# Patient Record
Sex: FEMALE | Race: BLACK OR AFRICAN AMERICAN | NOT HISPANIC OR LATINO | Employment: OTHER | RURAL
[De-identification: names, ages, dates, MRNs, and addresses within clinical notes are randomized per-mention and may not be internally consistent; named-entity substitution may affect disease eponyms.]

---

## 2017-07-03 ENCOUNTER — HISTORICAL (OUTPATIENT)
Dept: ADMINISTRATIVE | Facility: HOSPITAL | Age: 62
End: 2017-07-03

## 2017-07-06 LAB
LAB AP CLINICAL INFORMATION: NORMAL
LAB AP DIAGNOSIS - HISTORICAL: NORMAL
LAB AP GROSS PATHOLOGY - HISTORICAL: NORMAL
LAB AP SPECIMEN SUBMITTED - HISTORICAL: NORMAL

## 2019-12-11 LAB — CRC RECOMMENDATION EXT: NORMAL

## 2020-09-30 ENCOUNTER — HISTORICAL (OUTPATIENT)
Dept: ADMINISTRATIVE | Facility: HOSPITAL | Age: 65
End: 2020-09-30

## 2020-10-08 ENCOUNTER — HISTORICAL (OUTPATIENT)
Dept: ADMINISTRATIVE | Facility: HOSPITAL | Age: 65
End: 2020-10-08

## 2020-10-08 LAB — SARS-COV+SARS-COV-2 AG RESP QL IA.RAPID: NEGATIVE

## 2020-10-12 ENCOUNTER — HISTORICAL (OUTPATIENT)
Dept: ADMINISTRATIVE | Facility: HOSPITAL | Age: 65
End: 2020-10-12

## 2020-10-12 LAB
HCT VFR BLD AUTO: 39.1 % (ref 38–47)
HGB BLD-MCNC: 13.1 G/DL (ref 12–16)
MCHC RBC AUTO-ENTMCNC: 33.5 G/DL (ref 32–36)
POTASSIUM SERPL-SCNC: 3.3 MMOL/L (ref 3.5–5.1)

## 2020-11-30 ENCOUNTER — HISTORICAL (OUTPATIENT)
Dept: ADMINISTRATIVE | Facility: HOSPITAL | Age: 65
End: 2020-11-30

## 2020-11-30 LAB — SARS-COV+SARS-COV-2 AG RESP QL IA.RAPID: NEGATIVE

## 2021-07-21 ENCOUNTER — OFFICE VISIT (OUTPATIENT)
Dept: FAMILY MEDICINE | Facility: CLINIC | Age: 66
End: 2021-07-21
Payer: COMMERCIAL

## 2021-07-21 VITALS
WEIGHT: 208 LBS | HEART RATE: 88 BPM | RESPIRATION RATE: 16 BRPM | DIASTOLIC BLOOD PRESSURE: 70 MMHG | SYSTOLIC BLOOD PRESSURE: 120 MMHG | BODY MASS INDEX: 35.51 KG/M2 | HEIGHT: 64 IN | TEMPERATURE: 98 F

## 2021-07-21 DIAGNOSIS — I25.10 CORONARY ARTERY DISEASE, ANGINA PRESENCE UNSPECIFIED, UNSPECIFIED VESSEL OR LESION TYPE, UNSPECIFIED WHETHER NATIVE OR TRANSPLANTED HEART: ICD-10-CM

## 2021-07-21 DIAGNOSIS — E11.9 DIABETES MELLITUS WITHOUT COMPLICATION: ICD-10-CM

## 2021-07-21 DIAGNOSIS — Z79.899 ENCOUNTER FOR LONG-TERM (CURRENT) USE OF OTHER MEDICATIONS: ICD-10-CM

## 2021-07-21 DIAGNOSIS — F51.01 PRIMARY INSOMNIA: Primary | ICD-10-CM

## 2021-07-21 DIAGNOSIS — I10 ESSENTIAL HYPERTENSION, BENIGN: ICD-10-CM

## 2021-07-21 DIAGNOSIS — E78.5 HYPERLIPIDEMIA, UNSPECIFIED HYPERLIPIDEMIA TYPE: ICD-10-CM

## 2021-07-21 LAB
ALBUMIN SERPL BCP-MCNC: 4.1 G/DL (ref 3.5–5)
ALBUMIN/GLOB SERPL: 1.1 {RATIO}
ALP SERPL-CCNC: 70 U/L (ref 50–130)
ALT SERPL W P-5'-P-CCNC: 44 U/L (ref 13–56)
ANION GAP SERPL CALCULATED.3IONS-SCNC: 12 MMOL/L (ref 7–16)
AST SERPL W P-5'-P-CCNC: 23 U/L (ref 15–37)
BASOPHILS # BLD AUTO: 0.03 K/UL (ref 0–0.2)
BASOPHILS NFR BLD AUTO: 0.8 % (ref 0–1)
BILIRUB SERPL-MCNC: 0.5 MG/DL (ref 0–1.2)
BUN SERPL-MCNC: 13 MG/DL (ref 7–18)
BUN/CREAT SERPL: 12 (ref 6–20)
CALCIUM SERPL-MCNC: 9.7 MG/DL (ref 8.5–10.1)
CHLORIDE SERPL-SCNC: 107 MMOL/L (ref 98–107)
CHOLEST SERPL-MCNC: 191 MG/DL (ref 0–200)
CHOLEST/HDLC SERPL: 3.3 {RATIO}
CO2 SERPL-SCNC: 27 MMOL/L (ref 21–32)
CREAT SERPL-MCNC: 1.06 MG/DL (ref 0.55–1.02)
DIFFERENTIAL METHOD BLD: ABNORMAL
EOSINOPHIL # BLD AUTO: 0.09 K/UL (ref 0–0.5)
EOSINOPHIL NFR BLD AUTO: 2.4 % (ref 1–4)
ERYTHROCYTE [DISTWIDTH] IN BLOOD BY AUTOMATED COUNT: 14.4 % (ref 11.5–14.5)
EST. AVERAGE GLUCOSE BLD GHB EST-MCNC: 114 MG/DL
GLOBULIN SER-MCNC: 3.6 G/DL (ref 2–4)
GLUCOSE SERPL-MCNC: 90 MG/DL (ref 74–106)
HBA1C MFR BLD HPLC: 6 % (ref 4.5–6.6)
HCT VFR BLD AUTO: 40.7 % (ref 38–47)
HDLC SERPL-MCNC: 58 MG/DL (ref 40–60)
HGB BLD-MCNC: 13.3 G/DL (ref 12–16)
IMM GRANULOCYTES # BLD AUTO: 0.01 K/UL (ref 0–0.04)
IMM GRANULOCYTES NFR BLD: 0.3 % (ref 0–0.4)
LDLC SERPL CALC-MCNC: 96 MG/DL
LDLC/HDLC SERPL: 1.7 {RATIO}
LYMPHOCYTES # BLD AUTO: 1.63 K/UL (ref 1–4.8)
LYMPHOCYTES NFR BLD AUTO: 44.1 % (ref 27–41)
MCH RBC QN AUTO: 26.4 PG (ref 27–31)
MCHC RBC AUTO-ENTMCNC: 32.7 G/DL (ref 32–36)
MCV RBC AUTO: 80.8 FL (ref 80–96)
MONOCYTES # BLD AUTO: 0.39 K/UL (ref 0–0.8)
MONOCYTES NFR BLD AUTO: 10.5 % (ref 2–6)
MPC BLD CALC-MCNC: 9.8 FL (ref 9.4–12.4)
NEUTROPHILS # BLD AUTO: 1.55 K/UL (ref 1.8–7.7)
NEUTROPHILS NFR BLD AUTO: 41.9 % (ref 53–65)
NONHDLC SERPL-MCNC: 133 MG/DL
NRBC # BLD AUTO: 0 X10E3/UL
NRBC, AUTO (.00): 0 %
PLATELET # BLD AUTO: 339 K/UL (ref 150–400)
POTASSIUM SERPL-SCNC: 3.5 MMOL/L (ref 3.5–5.1)
PROT SERPL-MCNC: 7.7 G/DL (ref 6.4–8.2)
RBC # BLD AUTO: 5.04 M/UL (ref 4.2–5.4)
SODIUM SERPL-SCNC: 142 MMOL/L (ref 136–145)
TRIGL SERPL-MCNC: 187 MG/DL (ref 35–150)
VLDLC SERPL-MCNC: 37 MG/DL
WBC # BLD AUTO: 3.7 K/UL (ref 4.5–11)

## 2021-07-21 PROCEDURE — 83036 HEMOGLOBIN GLYCOSYLATED A1C: CPT | Mod: ,,, | Performed by: CLINICAL MEDICAL LABORATORY

## 2021-07-21 PROCEDURE — 99214 OFFICE O/P EST MOD 30 MIN: CPT | Mod: ,,, | Performed by: FAMILY MEDICINE

## 2021-07-21 PROCEDURE — 99214 PR OFFICE/OUTPT VISIT, EST, LEVL IV, 30-39 MIN: ICD-10-PCS | Mod: ,,, | Performed by: FAMILY MEDICINE

## 2021-07-21 PROCEDURE — 80053 COMPREHENSIVE METABOLIC PANEL: ICD-10-PCS | Mod: ,,, | Performed by: CLINICAL MEDICAL LABORATORY

## 2021-07-21 PROCEDURE — 85025 CBC WITH DIFFERENTIAL: ICD-10-PCS | Mod: ,,, | Performed by: CLINICAL MEDICAL LABORATORY

## 2021-07-21 PROCEDURE — 85025 COMPLETE CBC W/AUTO DIFF WBC: CPT | Mod: ,,, | Performed by: CLINICAL MEDICAL LABORATORY

## 2021-07-21 PROCEDURE — 80061 LIPID PANEL: ICD-10-PCS | Mod: ,,, | Performed by: CLINICAL MEDICAL LABORATORY

## 2021-07-21 PROCEDURE — 83036 HEMOGLOBIN A1C: ICD-10-PCS | Mod: ,,, | Performed by: CLINICAL MEDICAL LABORATORY

## 2021-07-21 PROCEDURE — 80061 LIPID PANEL: CPT | Mod: ,,, | Performed by: CLINICAL MEDICAL LABORATORY

## 2021-07-21 PROCEDURE — 80053 COMPREHEN METABOLIC PANEL: CPT | Mod: ,,, | Performed by: CLINICAL MEDICAL LABORATORY

## 2021-07-21 RX ORDER — METOPROLOL SUCCINATE 25 MG/1
1 TABLET, EXTENDED RELEASE ORAL DAILY
COMMUNITY
Start: 2021-05-18

## 2021-07-21 RX ORDER — LANOLIN ALCOHOL/MO/W.PET/CERES
2 CREAM (GRAM) TOPICAL DAILY
COMMUNITY

## 2021-07-21 RX ORDER — CHLORTHALIDONE 25 MG/1
TABLET ORAL
COMMUNITY
Start: 2021-05-18

## 2021-07-21 RX ORDER — ROSUVASTATIN CALCIUM 5 MG/1
1 TABLET, COATED ORAL DAILY
COMMUNITY
Start: 2021-05-18 | End: 2021-07-22

## 2021-07-21 RX ORDER — TRAZODONE HYDROCHLORIDE 50 MG/1
50 TABLET ORAL NIGHTLY
Qty: 30 TABLET | Refills: 1 | Status: SHIPPED | OUTPATIENT
Start: 2021-07-21 | End: 2021-11-03

## 2021-07-21 RX ORDER — POTASSIUM CHLORIDE 20 MEQ/1
1 TABLET, EXTENDED RELEASE ORAL 2 TIMES DAILY
COMMUNITY
Start: 2021-05-18

## 2021-07-21 RX ORDER — ASPIRIN 81 MG/1
81 TABLET ORAL DAILY
COMMUNITY

## 2021-07-21 RX ORDER — ACETAMINOPHEN 500 MG
1 TABLET ORAL DAILY
COMMUNITY

## 2021-07-22 ENCOUNTER — TELEPHONE (OUTPATIENT)
Dept: FAMILY MEDICINE | Facility: CLINIC | Age: 66
End: 2021-07-22

## 2021-07-22 RX ORDER — ROSUVASTATIN CALCIUM 10 MG/1
10 TABLET, COATED ORAL DAILY
Qty: 90 TABLET | Refills: 3 | Status: SHIPPED | OUTPATIENT
Start: 2021-07-22 | End: 2022-07-22

## 2021-07-22 RX ORDER — LEMBOREXANT 5 MG/1
5 TABLET, FILM COATED ORAL NIGHTLY
Qty: 30 TABLET | Refills: 0 | Status: SHIPPED | OUTPATIENT
Start: 2021-07-22 | End: 2021-09-20

## 2021-07-23 RX ORDER — HYDROXYZINE HYDROCHLORIDE 25 MG/1
TABLET, FILM COATED ORAL
Qty: 30 TABLET | Refills: 0 | Status: SHIPPED | OUTPATIENT
Start: 2021-07-23 | End: 2021-11-03

## 2021-08-08 ENCOUNTER — OFFICE VISIT (OUTPATIENT)
Dept: FAMILY MEDICINE | Facility: CLINIC | Age: 66
End: 2021-08-08
Payer: COMMERCIAL

## 2021-08-08 VITALS — OXYGEN SATURATION: 97 % | TEMPERATURE: 98 F | HEART RATE: 93 BPM

## 2021-08-08 DIAGNOSIS — Z20.822 CONTACT WITH AND (SUSPECTED) EXPOSURE TO COVID-19: Primary | ICD-10-CM

## 2021-08-08 LAB
CTP QC/QA: YES
SARS-COV-2 AG RESP QL IA.RAPID: NEGATIVE

## 2021-08-08 PROCEDURE — 1159F PR MEDICATION LIST DOCUMENTED IN MEDICAL RECORD: ICD-10-PCS | Mod: ,,, | Performed by: NURSE PRACTITIONER

## 2021-08-08 PROCEDURE — 1160F RVW MEDS BY RX/DR IN RCRD: CPT | Mod: ,,, | Performed by: NURSE PRACTITIONER

## 2021-08-08 PROCEDURE — 3044F HG A1C LEVEL LT 7.0%: CPT | Mod: ,,, | Performed by: NURSE PRACTITIONER

## 2021-08-08 PROCEDURE — 99051 MED SERV EVE/WKEND/HOLIDAY: CPT | Mod: ,,, | Performed by: NURSE PRACTITIONER

## 2021-08-08 PROCEDURE — 99051 PR MEDICAL SERVICES, EVE/WKEND/HOLIDAY: ICD-10-PCS | Mod: ,,, | Performed by: NURSE PRACTITIONER

## 2021-08-08 PROCEDURE — 99213 PR OFFICE/OUTPT VISIT, EST, LEVL III, 20-29 MIN: ICD-10-PCS | Mod: ,,, | Performed by: NURSE PRACTITIONER

## 2021-08-08 PROCEDURE — 87426 SARS CORONAVIRUS 2 ANTIGEN POCT: ICD-10-PCS | Mod: QW,,, | Performed by: NURSE PRACTITIONER

## 2021-08-08 PROCEDURE — 87426 SARSCOV CORONAVIRUS AG IA: CPT | Mod: QW,,, | Performed by: NURSE PRACTITIONER

## 2021-08-08 PROCEDURE — 1160F PR REVIEW ALL MEDS BY PRESCRIBER/CLIN PHARMACIST DOCUMENTED: ICD-10-PCS | Mod: ,,, | Performed by: NURSE PRACTITIONER

## 2021-08-08 PROCEDURE — 99213 OFFICE O/P EST LOW 20 MIN: CPT | Mod: ,,, | Performed by: NURSE PRACTITIONER

## 2021-08-08 PROCEDURE — 3044F PR MOST RECENT HEMOGLOBIN A1C LEVEL <7.0%: ICD-10-PCS | Mod: ,,, | Performed by: NURSE PRACTITIONER

## 2021-08-08 PROCEDURE — 1159F MED LIST DOCD IN RCRD: CPT | Mod: ,,, | Performed by: NURSE PRACTITIONER

## 2021-09-16 ENCOUNTER — OFFICE VISIT (OUTPATIENT)
Dept: FAMILY MEDICINE | Facility: CLINIC | Age: 66
End: 2021-09-16
Payer: COMMERCIAL

## 2021-09-16 VITALS
BODY MASS INDEX: 36.02 KG/M2 | DIASTOLIC BLOOD PRESSURE: 70 MMHG | WEIGHT: 211 LBS | RESPIRATION RATE: 16 BRPM | HEART RATE: 96 BPM | OXYGEN SATURATION: 99 % | HEIGHT: 64 IN | SYSTOLIC BLOOD PRESSURE: 120 MMHG | TEMPERATURE: 98 F

## 2021-09-16 DIAGNOSIS — Z79.899 ENCOUNTER FOR LONG-TERM (CURRENT) USE OF OTHER MEDICATIONS: ICD-10-CM

## 2021-09-16 DIAGNOSIS — L65.9 ALOPECIA: ICD-10-CM

## 2021-09-16 DIAGNOSIS — E78.5 HYPERLIPIDEMIA, UNSPECIFIED HYPERLIPIDEMIA TYPE: ICD-10-CM

## 2021-09-16 DIAGNOSIS — I10 ESSENTIAL HYPERTENSION, BENIGN: ICD-10-CM

## 2021-09-16 DIAGNOSIS — I25.10 CORONARY ARTERY DISEASE, ANGINA PRESENCE UNSPECIFIED, UNSPECIFIED VESSEL OR LESION TYPE, UNSPECIFIED WHETHER NATIVE OR TRANSPLANTED HEART: ICD-10-CM

## 2021-09-16 DIAGNOSIS — E11.9 DIABETES MELLITUS WITHOUT COMPLICATION: ICD-10-CM

## 2021-09-16 DIAGNOSIS — D70.9 NEUTROPENIA, UNSPECIFIED TYPE: ICD-10-CM

## 2021-09-16 DIAGNOSIS — R22.42 SUBCUTANEOUS MASS OF LEFT LOWER EXTREMITY: Primary | ICD-10-CM

## 2021-09-16 DIAGNOSIS — E55.9 VITAMIN D DEFICIENCY: ICD-10-CM

## 2021-09-16 LAB
BASOPHILS # BLD AUTO: 0.04 K/UL (ref 0–0.2)
BASOPHILS NFR BLD AUTO: 0.9 % (ref 0–1)
DIFFERENTIAL METHOD BLD: ABNORMAL
EOSINOPHIL # BLD AUTO: 0.09 K/UL (ref 0–0.5)
EOSINOPHIL NFR BLD AUTO: 2.1 % (ref 1–4)
ERYTHROCYTE [DISTWIDTH] IN BLOOD BY AUTOMATED COUNT: 14.1 % (ref 11.5–14.5)
ERYTHROCYTE [SEDIMENTATION RATE] IN BLOOD BY WESTERGREN METHOD: 7 MM/HR (ref 0–30)
HCT VFR BLD AUTO: 40.8 % (ref 38–47)
HGB BLD-MCNC: 13.4 G/DL (ref 12–16)
IMM GRANULOCYTES # BLD AUTO: 0.01 K/UL (ref 0–0.04)
IMM GRANULOCYTES NFR BLD: 0.2 % (ref 0–0.4)
LYMPHOCYTES # BLD AUTO: 1.97 K/UL (ref 1–4.8)
LYMPHOCYTES NFR BLD AUTO: 45.8 % (ref 27–41)
MCH RBC QN AUTO: 26.5 PG (ref 27–31)
MCHC RBC AUTO-ENTMCNC: 32.8 G/DL (ref 32–36)
MCV RBC AUTO: 80.6 FL (ref 80–96)
MONOCYTES # BLD AUTO: 0.48 K/UL (ref 0–0.8)
MONOCYTES NFR BLD AUTO: 11.2 % (ref 2–6)
MPC BLD CALC-MCNC: 9.9 FL (ref 9.4–12.4)
NEUTROPHILS # BLD AUTO: 1.71 K/UL (ref 1.8–7.7)
NEUTROPHILS NFR BLD AUTO: 39.8 % (ref 53–65)
NRBC # BLD AUTO: 0 X10E3/UL
NRBC, AUTO (.00): 0 %
PLATELET # BLD AUTO: 357 K/UL (ref 150–400)
RBC # BLD AUTO: 5.06 M/UL (ref 4.2–5.4)
WBC # BLD AUTO: 4.3 K/UL (ref 4.5–11)

## 2021-09-16 PROCEDURE — 82746 VITAMIN B12/FOLATE, SERUM PANEL: ICD-10-PCS | Mod: ,,, | Performed by: CLINICAL MEDICAL LABORATORY

## 2021-09-16 PROCEDURE — 83550 IRON BINDING TEST: CPT | Mod: ,,, | Performed by: CLINICAL MEDICAL LABORATORY

## 2021-09-16 PROCEDURE — 1160F RVW MEDS BY RX/DR IN RCRD: CPT | Mod: ,,, | Performed by: FAMILY MEDICINE

## 2021-09-16 PROCEDURE — 1160F PR REVIEW ALL MEDS BY PRESCRIBER/CLIN PHARMACIST DOCUMENTED: ICD-10-PCS | Mod: ,,, | Performed by: FAMILY MEDICINE

## 2021-09-16 PROCEDURE — 84443 TSH: ICD-10-PCS | Mod: ,,, | Performed by: CLINICAL MEDICAL LABORATORY

## 2021-09-16 PROCEDURE — 3074F SYST BP LT 130 MM HG: CPT | Mod: ,,, | Performed by: FAMILY MEDICINE

## 2021-09-16 PROCEDURE — 3074F PR MOST RECENT SYSTOLIC BLOOD PRESSURE < 130 MM HG: ICD-10-PCS | Mod: ,,, | Performed by: FAMILY MEDICINE

## 2021-09-16 PROCEDURE — 1159F MED LIST DOCD IN RCRD: CPT | Mod: ,,, | Performed by: FAMILY MEDICINE

## 2021-09-16 PROCEDURE — 84443 ASSAY THYROID STIM HORMONE: CPT | Mod: ,,, | Performed by: CLINICAL MEDICAL LABORATORY

## 2021-09-16 PROCEDURE — 3078F DIAST BP <80 MM HG: CPT | Mod: ,,, | Performed by: FAMILY MEDICINE

## 2021-09-16 PROCEDURE — 99213 OFFICE O/P EST LOW 20 MIN: CPT | Mod: ,,, | Performed by: FAMILY MEDICINE

## 2021-09-16 PROCEDURE — 83540 IRON AND TIBC: ICD-10-PCS | Mod: ,,, | Performed by: CLINICAL MEDICAL LABORATORY

## 2021-09-16 PROCEDURE — 84439 T4, FREE: ICD-10-PCS | Mod: ,,, | Performed by: CLINICAL MEDICAL LABORATORY

## 2021-09-16 PROCEDURE — 85025 COMPLETE CBC W/AUTO DIFF WBC: CPT | Mod: ,,, | Performed by: CLINICAL MEDICAL LABORATORY

## 2021-09-16 PROCEDURE — 3008F BODY MASS INDEX DOCD: CPT | Mod: ,,, | Performed by: FAMILY MEDICINE

## 2021-09-16 PROCEDURE — 82607 VITAMIN B12/FOLATE, SERUM PANEL: ICD-10-PCS | Mod: ,,, | Performed by: CLINICAL MEDICAL LABORATORY

## 2021-09-16 PROCEDURE — 82306 VITAMIN D: ICD-10-PCS | Mod: ,,, | Performed by: CLINICAL MEDICAL LABORATORY

## 2021-09-16 PROCEDURE — 82607 VITAMIN B-12: CPT | Mod: ,,, | Performed by: CLINICAL MEDICAL LABORATORY

## 2021-09-16 PROCEDURE — 99213 PR OFFICE/OUTPT VISIT, EST, LEVL III, 20-29 MIN: ICD-10-PCS | Mod: ,,, | Performed by: FAMILY MEDICINE

## 2021-09-16 PROCEDURE — 82728 FERRITIN: ICD-10-PCS | Mod: ,,, | Performed by: CLINICAL MEDICAL LABORATORY

## 2021-09-16 PROCEDURE — 83735 ASSAY OF MAGNESIUM: CPT | Mod: ,,, | Performed by: CLINICAL MEDICAL LABORATORY

## 2021-09-16 PROCEDURE — 1159F PR MEDICATION LIST DOCUMENTED IN MEDICAL RECORD: ICD-10-PCS | Mod: ,,, | Performed by: FAMILY MEDICINE

## 2021-09-16 PROCEDURE — 83735 MAGNESIUM: ICD-10-PCS | Mod: ,,, | Performed by: CLINICAL MEDICAL LABORATORY

## 2021-09-16 PROCEDURE — 85025 CBC WITH DIFFERENTIAL: ICD-10-PCS | Mod: ,,, | Performed by: CLINICAL MEDICAL LABORATORY

## 2021-09-16 PROCEDURE — 85651 SEDIMENTATION RATE, AUTOMATED: ICD-10-PCS | Mod: ,,, | Performed by: CLINICAL MEDICAL LABORATORY

## 2021-09-16 PROCEDURE — 83540 ASSAY OF IRON: CPT | Mod: ,,, | Performed by: CLINICAL MEDICAL LABORATORY

## 2021-09-16 PROCEDURE — 85651 RBC SED RATE NONAUTOMATED: CPT | Mod: ,,, | Performed by: CLINICAL MEDICAL LABORATORY

## 2021-09-16 PROCEDURE — 82306 VITAMIN D 25 HYDROXY: CPT | Mod: ,,, | Performed by: CLINICAL MEDICAL LABORATORY

## 2021-09-16 PROCEDURE — 80053 COMPREHEN METABOLIC PANEL: CPT | Mod: ,,, | Performed by: CLINICAL MEDICAL LABORATORY

## 2021-09-16 PROCEDURE — 3044F PR MOST RECENT HEMOGLOBIN A1C LEVEL <7.0%: ICD-10-PCS | Mod: ,,, | Performed by: FAMILY MEDICINE

## 2021-09-16 PROCEDURE — 3008F PR BODY MASS INDEX (BMI) DOCUMENTED: ICD-10-PCS | Mod: ,,, | Performed by: FAMILY MEDICINE

## 2021-09-16 PROCEDURE — 82746 ASSAY OF FOLIC ACID SERUM: CPT | Mod: ,,, | Performed by: CLINICAL MEDICAL LABORATORY

## 2021-09-16 PROCEDURE — 83550 IRON AND TIBC: ICD-10-PCS | Mod: ,,, | Performed by: CLINICAL MEDICAL LABORATORY

## 2021-09-16 PROCEDURE — 3078F PR MOST RECENT DIASTOLIC BLOOD PRESSURE < 80 MM HG: ICD-10-PCS | Mod: ,,, | Performed by: FAMILY MEDICINE

## 2021-09-16 PROCEDURE — 80053 COMPREHENSIVE METABOLIC PANEL: ICD-10-PCS | Mod: ,,, | Performed by: CLINICAL MEDICAL LABORATORY

## 2021-09-16 PROCEDURE — 3044F HG A1C LEVEL LT 7.0%: CPT | Mod: ,,, | Performed by: FAMILY MEDICINE

## 2021-09-16 PROCEDURE — 82728 ASSAY OF FERRITIN: CPT | Mod: ,,, | Performed by: CLINICAL MEDICAL LABORATORY

## 2021-09-16 PROCEDURE — 84439 ASSAY OF FREE THYROXINE: CPT | Mod: ,,, | Performed by: CLINICAL MEDICAL LABORATORY

## 2021-09-17 LAB
25(OH)D3 SERPL-MCNC: 35.4 NG/ML
ALBUMIN SERPL BCP-MCNC: 4.4 G/DL (ref 3.5–5)
ALBUMIN/GLOB SERPL: 1.3 {RATIO}
ALP SERPL-CCNC: 84 U/L (ref 50–130)
ALT SERPL W P-5'-P-CCNC: 42 U/L (ref 13–56)
ANION GAP SERPL CALCULATED.3IONS-SCNC: 12 MMOL/L (ref 7–16)
AST SERPL W P-5'-P-CCNC: 26 U/L (ref 15–37)
BILIRUB SERPL-MCNC: 0.2 MG/DL (ref 0–1.2)
BUN SERPL-MCNC: 16 MG/DL (ref 7–18)
BUN/CREAT SERPL: 13 (ref 6–20)
CALCIUM SERPL-MCNC: 9.6 MG/DL (ref 8.5–10.1)
CHLORIDE SERPL-SCNC: 109 MMOL/L (ref 98–107)
CO2 SERPL-SCNC: 24 MMOL/L (ref 21–32)
CREAT SERPL-MCNC: 1.21 MG/DL (ref 0.55–1.02)
FERRITIN SERPL-MCNC: 58 NG/ML (ref 8–252)
FOLATE SERPL-MCNC: 8.4 NG/ML (ref 3.1–17.5)
GLOBULIN SER-MCNC: 3.4 G/DL (ref 2–4)
GLUCOSE SERPL-MCNC: 107 MG/DL (ref 74–106)
IRON SATN MFR SERPL: 21 % (ref 14–50)
IRON SERPL-MCNC: 71 ΜG/DL (ref 50–170)
MAGNESIUM SERPL-MCNC: 2.2 MG/DL (ref 1.7–2.3)
POTASSIUM SERPL-SCNC: 3.3 MMOL/L (ref 3.5–5.1)
PROT SERPL-MCNC: 7.8 G/DL (ref 6.4–8.2)
SODIUM SERPL-SCNC: 142 MMOL/L (ref 136–145)
T4 FREE SERPL-MCNC: 0.76 NG/DL (ref 0.76–1.46)
TIBC SERPL-MCNC: 338 ΜG/DL (ref 250–450)
TSH SERPL DL<=0.005 MIU/L-ACNC: 1.16 UIU/ML (ref 0.36–3.74)
VIT B12 SERPL-MCNC: 526 PG/ML (ref 193–986)

## 2021-09-22 ENCOUNTER — HOSPITAL ENCOUNTER (OUTPATIENT)
Dept: RADIOLOGY | Facility: HOSPITAL | Age: 66
Discharge: HOME | End: 2021-09-22
Attending: FAMILY MEDICINE
Payer: COMMERCIAL

## 2021-09-22 DIAGNOSIS — R22.42 SUBCUTANEOUS MASS OF LEFT LOWER EXTREMITY: ICD-10-CM

## 2021-09-22 PROCEDURE — 76882 US LMTD JT/FCL EVL NVASC XTR: CPT | Mod: 26,LT,, | Performed by: RADIOLOGY

## 2021-09-22 PROCEDURE — 76882 US EXTREMITY NON VASCULAR LIMITED LEFT: ICD-10-PCS | Mod: 26,LT,, | Performed by: RADIOLOGY

## 2021-09-22 PROCEDURE — 76882 US LMTD JT/FCL EVL NVASC XTR: CPT | Mod: TC,LT

## 2021-10-22 ENCOUNTER — OFFICE VISIT (OUTPATIENT)
Dept: FAMILY MEDICINE | Facility: CLINIC | Age: 66
End: 2021-10-22
Payer: COMMERCIAL

## 2021-10-22 VITALS
OXYGEN SATURATION: 96 % | SYSTOLIC BLOOD PRESSURE: 140 MMHG | BODY MASS INDEX: 36.23 KG/M2 | HEIGHT: 64 IN | HEART RATE: 94 BPM | DIASTOLIC BLOOD PRESSURE: 100 MMHG | WEIGHT: 212.19 LBS | TEMPERATURE: 97 F | RESPIRATION RATE: 18 BRPM

## 2021-10-22 DIAGNOSIS — R53.83 FATIGUE, UNSPECIFIED TYPE: ICD-10-CM

## 2021-10-22 DIAGNOSIS — H11.31 SUBCONJUNCTIVAL HEMORRHAGE OF RIGHT EYE: Primary | ICD-10-CM

## 2021-10-22 PROCEDURE — 3044F PR MOST RECENT HEMOGLOBIN A1C LEVEL <7.0%: ICD-10-PCS | Mod: ,,, | Performed by: FAMILY MEDICINE

## 2021-10-22 PROCEDURE — 99213 OFFICE O/P EST LOW 20 MIN: CPT | Mod: ,,, | Performed by: FAMILY MEDICINE

## 2021-10-22 PROCEDURE — 1160F PR REVIEW ALL MEDS BY PRESCRIBER/CLIN PHARMACIST DOCUMENTED: ICD-10-PCS | Mod: ,,, | Performed by: FAMILY MEDICINE

## 2021-10-22 PROCEDURE — 1159F PR MEDICATION LIST DOCUMENTED IN MEDICAL RECORD: ICD-10-PCS | Mod: ,,, | Performed by: FAMILY MEDICINE

## 2021-10-22 PROCEDURE — 1160F RVW MEDS BY RX/DR IN RCRD: CPT | Mod: ,,, | Performed by: FAMILY MEDICINE

## 2021-10-22 PROCEDURE — 3044F HG A1C LEVEL LT 7.0%: CPT | Mod: ,,, | Performed by: FAMILY MEDICINE

## 2021-10-22 PROCEDURE — 1159F MED LIST DOCD IN RCRD: CPT | Mod: ,,, | Performed by: FAMILY MEDICINE

## 2021-10-22 PROCEDURE — 3008F PR BODY MASS INDEX (BMI) DOCUMENTED: ICD-10-PCS | Mod: ,,, | Performed by: FAMILY MEDICINE

## 2021-10-22 PROCEDURE — 3077F PR MOST RECENT SYSTOLIC BLOOD PRESSURE >= 140 MM HG: ICD-10-PCS | Mod: ,,, | Performed by: FAMILY MEDICINE

## 2021-10-22 PROCEDURE — 99213 PR OFFICE/OUTPT VISIT, EST, LEVL III, 20-29 MIN: ICD-10-PCS | Mod: ,,, | Performed by: FAMILY MEDICINE

## 2021-10-22 PROCEDURE — 3077F SYST BP >= 140 MM HG: CPT | Mod: ,,, | Performed by: FAMILY MEDICINE

## 2021-10-22 PROCEDURE — 3080F PR MOST RECENT DIASTOLIC BLOOD PRESSURE >= 90 MM HG: ICD-10-PCS | Mod: ,,, | Performed by: FAMILY MEDICINE

## 2021-10-22 PROCEDURE — 3008F BODY MASS INDEX DOCD: CPT | Mod: ,,, | Performed by: FAMILY MEDICINE

## 2021-10-22 PROCEDURE — 3080F DIAST BP >= 90 MM HG: CPT | Mod: ,,, | Performed by: FAMILY MEDICINE

## 2021-11-03 ENCOUNTER — OFFICE VISIT (OUTPATIENT)
Dept: FAMILY MEDICINE | Facility: CLINIC | Age: 66
End: 2021-11-03
Payer: COMMERCIAL

## 2021-11-03 VITALS
WEIGHT: 215 LBS | TEMPERATURE: 98 F | BODY MASS INDEX: 36.7 KG/M2 | SYSTOLIC BLOOD PRESSURE: 128 MMHG | HEIGHT: 64 IN | DIASTOLIC BLOOD PRESSURE: 80 MMHG | HEART RATE: 80 BPM | OXYGEN SATURATION: 97 % | RESPIRATION RATE: 16 BRPM

## 2021-11-03 DIAGNOSIS — Z23 NEED FOR IMMUNIZATION AGAINST INFLUENZA: ICD-10-CM

## 2021-11-03 DIAGNOSIS — Z79.899 ENCOUNTER FOR LONG-TERM (CURRENT) USE OF OTHER MEDICATIONS: ICD-10-CM

## 2021-11-03 DIAGNOSIS — F51.01 PRIMARY INSOMNIA: ICD-10-CM

## 2021-11-03 DIAGNOSIS — M25.552 LEFT HIP PAIN: Primary | ICD-10-CM

## 2021-11-03 PROCEDURE — 3008F BODY MASS INDEX DOCD: CPT | Mod: ,,, | Performed by: FAMILY MEDICINE

## 2021-11-03 PROCEDURE — 3074F SYST BP LT 130 MM HG: CPT | Mod: ,,, | Performed by: FAMILY MEDICINE

## 2021-11-03 PROCEDURE — 3079F PR MOST RECENT DIASTOLIC BLOOD PRESSURE 80-89 MM HG: ICD-10-PCS | Mod: ,,, | Performed by: FAMILY MEDICINE

## 2021-11-03 PROCEDURE — 99213 PR OFFICE/OUTPT VISIT, EST, LEVL III, 20-29 MIN: ICD-10-PCS | Mod: 25,,, | Performed by: FAMILY MEDICINE

## 2021-11-03 PROCEDURE — 3008F PR BODY MASS INDEX (BMI) DOCUMENTED: ICD-10-PCS | Mod: ,,, | Performed by: FAMILY MEDICINE

## 2021-11-03 PROCEDURE — 90471 IMMUNIZATION ADMIN: CPT | Mod: ,,, | Performed by: FAMILY MEDICINE

## 2021-11-03 PROCEDURE — 90471 FLU VACCINE (QUAD) GREATER THAN OR EQUAL TO 3YO PRESERVATIVE FREE IM: ICD-10-PCS | Mod: ,,, | Performed by: FAMILY MEDICINE

## 2021-11-03 PROCEDURE — 3079F DIAST BP 80-89 MM HG: CPT | Mod: ,,, | Performed by: FAMILY MEDICINE

## 2021-11-03 PROCEDURE — 90686 IIV4 VACC NO PRSV 0.5 ML IM: CPT | Mod: ,,, | Performed by: FAMILY MEDICINE

## 2021-11-03 PROCEDURE — 3074F PR MOST RECENT SYSTOLIC BLOOD PRESSURE < 130 MM HG: ICD-10-PCS | Mod: ,,, | Performed by: FAMILY MEDICINE

## 2021-11-03 PROCEDURE — 1160F PR REVIEW ALL MEDS BY PRESCRIBER/CLIN PHARMACIST DOCUMENTED: ICD-10-PCS | Mod: ,,, | Performed by: FAMILY MEDICINE

## 2021-11-03 PROCEDURE — 1159F MED LIST DOCD IN RCRD: CPT | Mod: ,,, | Performed by: FAMILY MEDICINE

## 2021-11-03 PROCEDURE — 3044F PR MOST RECENT HEMOGLOBIN A1C LEVEL <7.0%: ICD-10-PCS | Mod: ,,, | Performed by: FAMILY MEDICINE

## 2021-11-03 PROCEDURE — 1159F PR MEDICATION LIST DOCUMENTED IN MEDICAL RECORD: ICD-10-PCS | Mod: ,,, | Performed by: FAMILY MEDICINE

## 2021-11-03 PROCEDURE — 90686 FLU VACCINE (QUAD) GREATER THAN OR EQUAL TO 3YO PRESERVATIVE FREE IM: ICD-10-PCS | Mod: ,,, | Performed by: FAMILY MEDICINE

## 2021-11-03 PROCEDURE — 3044F HG A1C LEVEL LT 7.0%: CPT | Mod: ,,, | Performed by: FAMILY MEDICINE

## 2021-11-03 PROCEDURE — 1160F RVW MEDS BY RX/DR IN RCRD: CPT | Mod: ,,, | Performed by: FAMILY MEDICINE

## 2021-11-03 PROCEDURE — 99213 OFFICE O/P EST LOW 20 MIN: CPT | Mod: 25,,, | Performed by: FAMILY MEDICINE

## 2021-11-03 RX ORDER — IBUPROFEN 600 MG/1
600 TABLET ORAL EVERY 6 HOURS PRN
Qty: 40 TABLET | Refills: 0 | Status: SHIPPED | OUTPATIENT
Start: 2021-11-03 | End: 2021-12-08 | Stop reason: SDUPTHER

## 2021-11-03 RX ORDER — HYDROXYZINE HYDROCHLORIDE 50 MG/1
50 TABLET, FILM COATED ORAL NIGHTLY PRN
Qty: 90 TABLET | Refills: 3 | Status: SHIPPED | OUTPATIENT
Start: 2021-11-03

## 2021-11-22 LAB — BCS RECOMMENDATION EXT: NORMAL

## 2021-12-08 ENCOUNTER — OFFICE VISIT (OUTPATIENT)
Dept: FAMILY MEDICINE | Facility: CLINIC | Age: 66
End: 2021-12-08
Payer: COMMERCIAL

## 2021-12-08 VITALS
TEMPERATURE: 98 F | HEIGHT: 64 IN | WEIGHT: 217 LBS | DIASTOLIC BLOOD PRESSURE: 70 MMHG | BODY MASS INDEX: 37.05 KG/M2 | HEART RATE: 94 BPM | SYSTOLIC BLOOD PRESSURE: 118 MMHG | RESPIRATION RATE: 16 BRPM

## 2021-12-08 DIAGNOSIS — Z13.1 SCREENING FOR DIABETES MELLITUS: ICD-10-CM

## 2021-12-08 DIAGNOSIS — Z00.00 ROUTINE GENERAL MEDICAL EXAMINATION AT A HEALTH CARE FACILITY: Primary | ICD-10-CM

## 2021-12-08 DIAGNOSIS — E11.9 DIABETES MELLITUS WITHOUT COMPLICATION: ICD-10-CM

## 2021-12-08 DIAGNOSIS — Z23 NEED FOR VACCINATION: ICD-10-CM

## 2021-12-08 DIAGNOSIS — R73.9 HYPERGLYCEMIA: ICD-10-CM

## 2021-12-08 DIAGNOSIS — Z13.220 SCREENING FOR LIPOID DISORDERS: ICD-10-CM

## 2021-12-08 LAB
CHOLEST SERPL-MCNC: 172 MG/DL (ref 0–200)
CHOLEST/HDLC SERPL: 3.7 {RATIO}
EST. AVERAGE GLUCOSE BLD GHB EST-MCNC: 130 MG/DL
GLUCOSE SERPL-MCNC: 81 MG/DL (ref 74–106)
HBA1C MFR BLD HPLC: 6.5 % (ref 4.5–6.6)
HDLC SERPL-MCNC: 46 MG/DL (ref 40–60)
LDLC SERPL CALC-MCNC: 87 MG/DL
LDLC/HDLC SERPL: 1.9 {RATIO}
NONHDLC SERPL-MCNC: 126 MG/DL
TRIGL SERPL-MCNC: 194 MG/DL (ref 35–150)
VLDLC SERPL-MCNC: 39 MG/DL

## 2021-12-08 PROCEDURE — 90750 ZOSTER RECOMBINANT VACCINE: ICD-10-PCS | Mod: ,,, | Performed by: FAMILY MEDICINE

## 2021-12-08 PROCEDURE — 82947 GLUCOSE, FASTING: ICD-10-PCS | Mod: ,,, | Performed by: CLINICAL MEDICAL LABORATORY

## 2021-12-08 PROCEDURE — 83036 HEMOGLOBIN A1C: ICD-10-PCS | Mod: ,,, | Performed by: CLINICAL MEDICAL LABORATORY

## 2021-12-08 PROCEDURE — 90471 IMMUNIZATION ADMIN: CPT | Mod: ,,, | Performed by: FAMILY MEDICINE

## 2021-12-08 PROCEDURE — 82947 ASSAY GLUCOSE BLOOD QUANT: CPT | Mod: ,,, | Performed by: CLINICAL MEDICAL LABORATORY

## 2021-12-08 PROCEDURE — 90471 ZOSTER RECOMBINANT VACCINE: ICD-10-PCS | Mod: ,,, | Performed by: FAMILY MEDICINE

## 2021-12-08 PROCEDURE — 80061 LIPID PANEL: CPT | Mod: ,,, | Performed by: CLINICAL MEDICAL LABORATORY

## 2021-12-08 PROCEDURE — 80061 LIPID PANEL: ICD-10-PCS | Mod: ,,, | Performed by: CLINICAL MEDICAL LABORATORY

## 2021-12-08 PROCEDURE — 99396 PREV VISIT EST AGE 40-64: CPT | Mod: 25,,, | Performed by: FAMILY MEDICINE

## 2021-12-08 PROCEDURE — 90750 HZV VACC RECOMBINANT IM: CPT | Mod: ,,, | Performed by: FAMILY MEDICINE

## 2021-12-08 PROCEDURE — 99396 PR PREVENTIVE VISIT,EST,40-64: ICD-10-PCS | Mod: 25,,, | Performed by: FAMILY MEDICINE

## 2021-12-08 PROCEDURE — 83036 HEMOGLOBIN GLYCOSYLATED A1C: CPT | Mod: ,,, | Performed by: CLINICAL MEDICAL LABORATORY

## 2021-12-08 RX ORDER — DICLOFENAC SODIUM 10 MG/G
4 GEL TOPICAL 4 TIMES DAILY
Qty: 100 G | Refills: 11 | Status: SHIPPED | OUTPATIENT
Start: 2021-12-08

## 2021-12-08 RX ORDER — IBUPROFEN 600 MG/1
600 TABLET ORAL EVERY 6 HOURS PRN
Qty: 60 TABLET | Refills: 5 | Status: SHIPPED | OUTPATIENT
Start: 2021-12-08

## 2021-12-09 PROBLEM — R73.9 HYPERGLYCEMIA: Status: ACTIVE | Noted: 2021-12-09

## 2022-01-14 ENCOUNTER — CLINICAL SUPPORT (OUTPATIENT)
Dept: FAMILY MEDICINE | Facility: CLINIC | Age: 67
End: 2022-01-14
Payer: COMMERCIAL

## 2022-01-14 DIAGNOSIS — Z23 NEED FOR VACCINATION: Primary | ICD-10-CM

## 2022-01-14 PROCEDURE — 90750 HZV VACC RECOMBINANT IM: CPT | Mod: ,,, | Performed by: FAMILY MEDICINE

## 2022-01-14 PROCEDURE — 90471 ZOSTER RECOMBINANT VACCINE: ICD-10-PCS | Mod: ,,, | Performed by: FAMILY MEDICINE

## 2022-01-14 PROCEDURE — 90750 ZOSTER RECOMBINANT VACCINE: ICD-10-PCS | Mod: ,,, | Performed by: FAMILY MEDICINE

## 2022-01-14 PROCEDURE — 90471 IMMUNIZATION ADMIN: CPT | Mod: ,,, | Performed by: FAMILY MEDICINE

## 2022-01-26 ENCOUNTER — OFFICE VISIT (OUTPATIENT)
Dept: FAMILY MEDICINE | Facility: CLINIC | Age: 67
End: 2022-01-26
Payer: COMMERCIAL

## 2022-01-26 VITALS
WEIGHT: 217 LBS | TEMPERATURE: 97 F | RESPIRATION RATE: 16 BRPM | SYSTOLIC BLOOD PRESSURE: 110 MMHG | BODY MASS INDEX: 37.05 KG/M2 | DIASTOLIC BLOOD PRESSURE: 70 MMHG | OXYGEN SATURATION: 97 % | HEIGHT: 64 IN | HEART RATE: 98 BPM

## 2022-01-26 DIAGNOSIS — Z79.899 ENCOUNTER FOR LONG-TERM (CURRENT) USE OF OTHER MEDICATIONS: ICD-10-CM

## 2022-01-26 DIAGNOSIS — I87.2 VENOUS INSUFFICIENCY: ICD-10-CM

## 2022-01-26 DIAGNOSIS — M79.604 PAIN OF RIGHT LOWER EXTREMITY: Primary | ICD-10-CM

## 2022-01-26 PROCEDURE — 1159F MED LIST DOCD IN RCRD: CPT | Mod: ,,, | Performed by: FAMILY MEDICINE

## 2022-01-26 PROCEDURE — 3008F PR BODY MASS INDEX (BMI) DOCUMENTED: ICD-10-PCS | Mod: ,,, | Performed by: FAMILY MEDICINE

## 2022-01-26 PROCEDURE — 99212 PR OFFICE/OUTPT VISIT, EST, LEVL II, 10-19 MIN: ICD-10-PCS | Mod: ,,, | Performed by: FAMILY MEDICINE

## 2022-01-26 PROCEDURE — 3078F PR MOST RECENT DIASTOLIC BLOOD PRESSURE < 80 MM HG: ICD-10-PCS | Mod: ,,, | Performed by: FAMILY MEDICINE

## 2022-01-26 PROCEDURE — 3008F BODY MASS INDEX DOCD: CPT | Mod: ,,, | Performed by: FAMILY MEDICINE

## 2022-01-26 PROCEDURE — 3074F SYST BP LT 130 MM HG: CPT | Mod: ,,, | Performed by: FAMILY MEDICINE

## 2022-01-26 PROCEDURE — 1160F PR REVIEW ALL MEDS BY PRESCRIBER/CLIN PHARMACIST DOCUMENTED: ICD-10-PCS | Mod: ,,, | Performed by: FAMILY MEDICINE

## 2022-01-26 PROCEDURE — 3078F DIAST BP <80 MM HG: CPT | Mod: ,,, | Performed by: FAMILY MEDICINE

## 2022-01-26 PROCEDURE — 1160F RVW MEDS BY RX/DR IN RCRD: CPT | Mod: ,,, | Performed by: FAMILY MEDICINE

## 2022-01-26 PROCEDURE — 99212 OFFICE O/P EST SF 10 MIN: CPT | Mod: ,,, | Performed by: FAMILY MEDICINE

## 2022-01-26 PROCEDURE — 1159F PR MEDICATION LIST DOCUMENTED IN MEDICAL RECORD: ICD-10-PCS | Mod: ,,, | Performed by: FAMILY MEDICINE

## 2022-01-26 PROCEDURE — 3074F PR MOST RECENT SYSTOLIC BLOOD PRESSURE < 130 MM HG: ICD-10-PCS | Mod: ,,, | Performed by: FAMILY MEDICINE

## 2022-01-26 NOTE — PROGRESS NOTES
Subjective:       Patient ID: Lydia Head is a 66 y.o. female.    Chief Complaint: Leg Pain      Been waking up at night with right lateral leg pain.  Goes away spontaneously but pain has been severe.  Tried some topical.  Doesn't bother her in the day time.  No swelling or feel hot.  Was not a cramp.    Review of Systems   Constitutional: Negative for appetite change, chills, fatigue, fever and unexpected weight change.   Respiratory: Negative for cough and shortness of breath.    Cardiovascular: Negative for chest pain and leg swelling.   Gastrointestinal: Negative for abdominal pain.   Musculoskeletal: Positive for leg pain and myalgias. Negative for arthralgias.   Integumentary:  Negative for rash.   Neurological: Negative for weakness.   Psychiatric/Behavioral: The patient is not nervous/anxious.          Objective:      Physical Exam  Constitutional:       General: She is not in acute distress.     Appearance: Normal appearance.   Cardiovascular:      Rate and Rhythm: Normal rate and regular rhythm.      Heart sounds: Normal heart sounds.      Comments: Possible palpable varicose veins in right lateral leg  Pulmonary:      Breath sounds: Normal breath sounds.   Abdominal:      General: Abdomen is flat.      Palpations: Abdomen is soft.   Skin:     General: Skin is warm and dry.   Neurological:      Mental Status: She is alert. Mental status is at baseline.   Psychiatric:         Mood and Affect: Mood normal.         Behavior: Behavior normal.         Thought Content: Thought content normal.         Judgment: Judgment normal.         Assessment:       1. Pain of right lower extremity  Ambulatory referral/consult to [facility]   2. Venous insufficiency  Ambulatory referral/consult to [facility]   3. Encounter for long-term (current) use of other medications         Plan:       [facility] center  Compression hose

## 2022-01-27 PROBLEM — I87.2 VENOUS INSUFFICIENCY: Status: ACTIVE | Noted: 2022-01-27

## 2022-01-27 PROBLEM — M79.604 PAIN OF RIGHT LOWER EXTREMITY: Status: ACTIVE | Noted: 2022-01-27

## 2022-02-01 ENCOUNTER — OFFICE VISIT (OUTPATIENT)
Dept: VASCULAR SURGERY | Facility: CLINIC | Age: 67
End: 2022-02-01
Payer: COMMERCIAL

## 2022-02-01 ENCOUNTER — HOSPITAL ENCOUNTER (OUTPATIENT)
Dept: RADIOLOGY | Facility: HOSPITAL | Age: 67
Discharge: HOME | End: 2022-02-01
Attending: FAMILY MEDICINE
Payer: COMMERCIAL

## 2022-02-01 VITALS
HEART RATE: 96 BPM | BODY MASS INDEX: 36.98 KG/M2 | WEIGHT: 216.63 LBS | RESPIRATION RATE: 14 BRPM | HEIGHT: 64 IN | DIASTOLIC BLOOD PRESSURE: 70 MMHG | SYSTOLIC BLOOD PRESSURE: 132 MMHG

## 2022-02-01 DIAGNOSIS — R60.9 EDEMA: ICD-10-CM

## 2022-02-01 DIAGNOSIS — I87.2 VENOUS INSUFFICIENCY: ICD-10-CM

## 2022-02-01 DIAGNOSIS — M79.604 PAIN OF RIGHT LOWER EXTREMITY: ICD-10-CM

## 2022-02-01 PROCEDURE — 99215 OFFICE O/P EST HI 40 MIN: CPT | Mod: PBBFAC,25 | Performed by: FAMILY MEDICINE

## 2022-02-01 PROCEDURE — 93970 EXTREMITY STUDY: CPT | Mod: TC

## 2022-02-01 PROCEDURE — 93970 EXTREMITY STUDY: CPT | Mod: 26,,, | Performed by: FAMILY MEDICINE

## 2022-02-01 PROCEDURE — 99204 OFFICE O/P NEW MOD 45 MIN: CPT | Mod: S$PBB,,, | Performed by: FAMILY MEDICINE

## 2022-02-01 PROCEDURE — 99204 PR OFFICE/OUTPT VISIT, NEW, LEVL IV, 45-59 MIN: ICD-10-PCS | Mod: S$PBB,,, | Performed by: FAMILY MEDICINE

## 2022-02-01 PROCEDURE — 93970 US VENOUS REFLUX STUDY BILATERAL: ICD-10-PCS | Mod: 26,,, | Performed by: FAMILY MEDICINE

## 2022-02-01 RX ORDER — ACETAMINOPHEN 160 MG/5ML
200 SUSPENSION, ORAL (FINAL DOSE FORM) ORAL DAILY
Qty: 30 CAPSULE | Refills: 2 | Status: SHIPPED | OUTPATIENT
Start: 2022-02-01 | End: 2022-08-25

## 2022-02-01 NOTE — PROGRESS NOTES
[facility] CLINIC NOTE    Patient ID: Lydia Head is a 66 y.o. female.    I. HISTORY     Chief Complaint:   Chief Complaint   Patient presents with    Leg Pain     NP referral from Dr. Marcie Portillo re: leg pain, right worst than left.        HPI: Lydia Head is a 66 y.o. female who is referred here today by Dr Rodolfo Portillo for evaluation of lower extremity pain.  Symptoms are intermittent and began approximately 2-3 weeks ago.  Location is bilateral lower extremities, mainly on the lateral aspect of the lower legs. Symptoms are about the same at the end of the day.  History of venous interventions includes none.  Negative family history of venous disease.      The patient underwent a bilateral complete venous reflux study and the results were discussed with patient.  This study shows no evidence of DVT or superficial venous reflux bilaterally.      Venous Disease Medical Necessity Documentation Initial Visit Date:  2/1/22 Return Check Date:    1. Have you ever had a rupture or bleed from a varicose vein in your leg(s)?              [] Yes  [x] No   [] Yes   [] No   2. Have you ever been diagnosed with phlebitis, cellulitis, or inflammation in the area of the varicose veins of  your leg(s)?  [] Yes  [x] No    [] Yes   [] No   3. Do you have darkened or inflamed skin on your legs?   [] Yes   [x] No   [] Yes   [] No   4. Do you have leg swelling?     [] Yes   [x] No   [] Yes   [] No   5. Do you have leg pain?   [x] Yes   [] No   [] Yes   [] No   If yes, describe the type of pain?    []   Stabbing []  Radiating [x]  Aching   []  Tightness []  Throbbing               []  Burning []  Cramping              6. Do you have leg discomfort?   [x] Yes   [] No   [] Yes   [] No   If yes, describe the type of discomfort?    []  Heaviness []  Fullness   []  Restlessness [] Tired/Fatigued [] Itching              7. Have you ever worn compression hose?   [x] Yes   [] No   [] Yes   [] No   If yes, how long?           8. Do  you elevate your legs while sitting?   [x] Yes   [] No   [] Yes   [] No   9. Does venous disease (varicose veins, ulcers, skin changes, leg pain/swelling) interfere with your daily life?  If yes, check activities you are limited or unable to do.    []  Shower  []   Walk  []  Exercise  [] Play with children/grandchildren  []  Shop [] Work [] Stand for any period of time [x] Sleep                               [] Sitting for an extended period of time.           [] Yes   [x] No   [] Yes   [] No   10. Do you exercise/have you tried to exercise (i.e.  Walk our participate in a regular exercise routine)?  [] Yes  [x] No   [] Yes   [] No   11. BMI   37.2           Past Medical History:   Diagnosis Date    Acquired cyst of kidney     Coronary arteriosclerosis     Encounter for long-term (current) use of other medications     Fatigue     Hx of screening mammography 2018    [facility] 2021    [facility]    Hyperlipidemia     Hypertension     Multiple joint pain     Nutritional anemia     Vitamin D deficiency         Past Surgical History:   Procedure Laterality Date     SECTION      HYSTERECTOMY      ROTATOR CUFF REPAIR Right     procedure done twice per Dr. Jim Cornelius.    TOTAL REPLACEMENT OF BOTH HIP JOINTS USING COMPUTER-ASSISTED NAVIGATION Right     Performed by Dr. Jim Cornelius.    VAGINAL DELIVERY      x 2       Social History     Tobacco Use   Smoking Status Never Smoker   Smokeless Tobacco Never Used         Current Outpatient Medications:     aspirin (ECOTRIN) 81 MG EC tablet, Take 81 mg by mouth once daily., Disp: , Rfl:     calcium citrate-vitamin D3 315-200 mg (CITRACAL+D) 315 mg-5 mcg (200 unit) per tablet, Take 2 tablets by mouth once daily., Disp: , Rfl:     chlorthalidone (HYGROTEN) 25 MG Tab, 1/2 tab by mouth daily, Disp: , Rfl:     cholecalciferol, vitamin D3, (VITAMIN D3) 50 mcg (2,000 unit) Cap, Take 1 capsule by mouth once daily., Disp: , Rfl:      diclofenac sodium (VOLTAREN) 1 % Gel, Apply 4 g topically 4 (four) times daily., Disp: 100 g, Rfl: 11    ibuprofen (ADVIL,MOTRIN) 600 MG tablet, Take 1 tablet (600 mg total) by mouth every 6 (six) hours as needed for Pain., Disp: 60 tablet, Rfl: 5    metoprolol succinate (TOPROL-XL) 25 MG 24 hr tablet, Take 1 tablet by mouth once daily., Disp: , Rfl:     potassium chloride SA (K-DUR,KLOR-CON) 20 MEQ tablet, Take 1 tablet by mouth 2 (two) times a day., Disp: , Rfl:     rosuvastatin (CRESTOR) 10 MG tablet, Take 1 tablet (10 mg total) by mouth once daily., Disp: 90 tablet, Rfl: 3    coenzyme Q10 200 mg capsule, Take 200 mg by mouth once daily., Disp: 30 capsule, Rfl: 2    hydrOXYzine (ATARAX) 50 MG tablet, Take 1 tablet (50 mg total) by mouth nightly as needed (insomnia). (Patient not taking: Reported on 2/1/2022), Disp: 90 tablet, Rfl: 3    Review of Systems   Constitutional: Negative for activity change, chills, diaphoresis, fatigue and fever.   Respiratory: Negative for cough and shortness of breath.    Cardiovascular: Negative for chest pain and claudication.        Hyperpigmentation LE   Gastrointestinal: Negative for nausea and vomiting.   Musculoskeletal: Positive for leg pain. Negative for joint swelling.   Integumentary:  Negative for rash and wound.   Neurological: Negative for weakness and numbness.          II. PHYSICAL EXAM     Physical Exam  Constitutional:       General: She is awake. She is not in acute distress.     Appearance: Normal appearance. She is not ill-appearing or toxic-appearing.   HENT:      Head: Normocephalic and atraumatic.   Eyes:      Extraocular Movements: Extraocular movements intact.      Conjunctiva/sclera: Conjunctivae normal.      Pupils: Pupils are equal, round, and reactive to light.   Neck:      Vascular: No carotid bruit or JVD.   Cardiovascular:      Rate and Rhythm: Normal rate and regular rhythm.      Pulses:           Dorsalis pedis pulses are detected w/ Doppler on  the right side and detected w/ Doppler on the left side.        Posterior tibial pulses are detected w/ Doppler on the right side and detected w/ Doppler on the left side.      Heart sounds: No murmur heard.      Pulmonary:      Effort: Pulmonary effort is normal. No respiratory distress.      Breath sounds: No stridor. No wheezing, rhonchi or rales.   Musculoskeletal:         General: No swelling, tenderness or deformity.      Right lower leg: No edema.      Left lower leg: No edema.      Comments: No evidence of cellulitis, weeping or open ulcerations.   Feet:      Comments: Triphasic hand-held dopplerable DPs and PTs noted bilaterally.  Skin:     General: Skin is warm.      Capillary Refill: Capillary refill takes less than 2 seconds.      Coloration: Skin is not ashen.      Findings: No bruising, erythema, lesion, rash or wound.   Neurological:      Mental Status: She is alert and oriented to person, place, and time.      Motor: No weakness.   Psychiatric:         Speech: Speech normal.         Behavior: Behavior normal. Behavior is cooperative.       Reticular/Spider veins noted:  RLE: none  LLE: none    Varicose veins noted:  RLE: ankle and foot  LLE:  ankle and foot    CEAP Classification     Venous Clinical Severity Score     III. ASSESSMENT & PLAN (MEDICAL DECISION MAKING)     1. Pain of right lower extremity    2. Venous insufficiency      Assessment/Diagnosis and Plan:  Bilateral complete venous reflux study today negative for DVT or superficial venous reflux disease.    Have advised the patient to trial some Co Q10 for nighttime leg cramping and follow-up with Dr. Marcie Portillo when she follows up about her potassium level.  We will follow up with her p.r.n..    Orders Placed This Encounter    coenzyme Q10 200 mg capsule      Armani Portillo,

## 2022-07-26 ENCOUNTER — TELEPHONE (OUTPATIENT)
Dept: FAMILY MEDICINE | Facility: CLINIC | Age: 67
End: 2022-07-26
Payer: MEDICARE

## 2022-07-26 ENCOUNTER — APPOINTMENT (OUTPATIENT)
Dept: LAB | Facility: CLINIC | Age: 67
End: 2022-07-26
Payer: MEDICARE

## 2022-07-26 DIAGNOSIS — J06.9 UPPER RESPIRATORY TRACT INFECTION, UNSPECIFIED TYPE: Primary | ICD-10-CM

## 2022-07-26 LAB
CTP QC/QA: YES
FLUAV AG NPH QL: NEGATIVE
FLUBV AG NPH QL: NEGATIVE
SARS-COV-2 AG RESP QL IA.RAPID: POSITIVE

## 2022-07-26 PROCEDURE — 87428 SARSCOV & INF VIR A&B AG IA: CPT | Mod: RHCUB | Performed by: FAMILY MEDICINE

## 2022-07-26 NOTE — TELEPHONE ENCOUNTER
Pt tested pos for covid.  Symptoms started seven days ago.  Has had vaccine and booster.  Recommend quarantine x five days.  Followup prn

## 2022-08-09 DIAGNOSIS — Z71.89 COMPLEX CARE COORDINATION: ICD-10-CM

## 2022-08-24 DIAGNOSIS — M25.551 RIGHT HIP PAIN: Primary | ICD-10-CM

## 2022-08-25 ENCOUNTER — HOSPITAL ENCOUNTER (OUTPATIENT)
Dept: RADIOLOGY | Facility: HOSPITAL | Age: 67
Discharge: HOME | End: 2022-08-25
Attending: ORTHOPAEDIC SURGERY
Payer: MEDICARE

## 2022-08-25 ENCOUNTER — OFFICE VISIT (OUTPATIENT)
Dept: ORTHOPEDICS | Facility: CLINIC | Age: 67
End: 2022-08-25
Payer: MEDICARE

## 2022-08-25 VITALS — BODY MASS INDEX: 35.7 KG/M2 | WEIGHT: 208 LBS

## 2022-08-25 DIAGNOSIS — Z96.649 STATUS POST REVISION OF TOTAL HIP: ICD-10-CM

## 2022-08-25 DIAGNOSIS — M25.551 RIGHT HIP PAIN: ICD-10-CM

## 2022-08-25 DIAGNOSIS — M25.551 RIGHT HIP PAIN: Primary | ICD-10-CM

## 2022-08-25 PROCEDURE — 73502 X-RAY EXAM HIP UNI 2-3 VIEWS: CPT | Mod: TC,RT

## 2022-08-25 PROCEDURE — 73502 XR HIP WITH PELVIS WHEN PERFORMED, 2 OR 3  VIEWS RIGHT: ICD-10-PCS | Mod: 26,RT,, | Performed by: ORTHOPAEDIC SURGERY

## 2022-08-25 PROCEDURE — 73502 X-RAY EXAM HIP UNI 2-3 VIEWS: CPT | Mod: 26,RT,, | Performed by: ORTHOPAEDIC SURGERY

## 2022-08-25 PROCEDURE — 99214 OFFICE O/P EST MOD 30 MIN: CPT | Mod: ,,, | Performed by: ORTHOPAEDIC SURGERY

## 2022-08-25 PROCEDURE — 99214 PR OFFICE/OUTPT VISIT, EST, LEVL IV, 30-39 MIN: ICD-10-PCS | Mod: ,,, | Performed by: ORTHOPAEDIC SURGERY

## 2022-08-25 NOTE — PROGRESS NOTES
HPI:   Lydia Head is a pleasant 67 y.o. patient who reports to clinic for evaluation of right hip pain. She had a THR and a revision in 2016 and 2017. She has done well with this but has noticed that she is having intermittent pain over the greater trochanteric area on the right side. She has begun a walking program and is concerned that she may need her hip checked.    Pain has been pronounced for about 1 month  Injury onset and description: no injury  Patient's occupation: retired  This is not a work related injury.   This injury has been non-responsive to conservative care. The pain is worse with repetitive use, and strenuous activity is very difficult.  her pain improves with rest.  she rates pain as a  3/10on the Visual Analog Scale.        PAST MEDICAL HISTORY:   Past Medical History:   Diagnosis Date    Acquired cyst of kidney     Coronary arteriosclerosis     Encounter for long-term (current) use of other medications     Fatigue     Hx of screening mammography 2018    [facility]    Hyperlipidemia     Hypertension     Multiple joint pain     Nutritional anemia     Vitamin D deficiency      PAST SURGICAL HISTORY:   Past Surgical History:   Procedure Laterality Date     SECTION      HYSTERECTOMY      ROTATOR CUFF REPAIR Right     procedure done twice per Dr. Jim Cornelius.    TOTAL REPLACEMENT OF BOTH HIP JOINTS USING COMPUTER-ASSISTED NAVIGATION Right     Performed by Dr. Jim Cornelius.    VAGINAL DELIVERY      x 2     MEDICATIONS:    Current Outpatient Medications:     aspirin (ECOTRIN) 81 MG EC tablet, Take 81 mg by mouth once daily., Disp: , Rfl:     calcium citrate-vitamin D3 315-200 mg (CITRACAL+D) 315 mg-5 mcg (200 unit) per tablet, Take 2 tablets by mouth once daily., Disp: , Rfl:     chlorthalidone (HYGROTEN) 25 MG Tab, 1/2 tab by mouth daily, Disp: , Rfl:     cholecalciferol, vitamin D3, (VITAMIN D3) 50 mcg (2,000 unit) Cap,  Take 1 capsule by mouth once daily., Disp: , Rfl:     diclofenac sodium (VOLTAREN) 1 % Gel, Apply 4 g topically 4 (four) times daily., Disp: 100 g, Rfl: 11    hydrOXYzine (ATARAX) 50 MG tablet, Take 1 tablet (50 mg total) by mouth nightly as needed (insomnia). (Patient not taking: Reported on 2/1/2022), Disp: 90 tablet, Rfl: 3    ibuprofen (ADVIL,MOTRIN) 600 MG tablet, Take 1 tablet (600 mg total) by mouth every 6 (six) hours as needed for Pain., Disp: 60 tablet, Rfl: 5    metoprolol succinate (TOPROL-XL) 25 MG 24 hr tablet, Take 1 tablet by mouth once daily., Disp: , Rfl:     potassium chloride SA (K-DUR,KLOR-CON) 20 MEQ tablet, Take 1 tablet by mouth 2 (two) times a day., Disp: , Rfl:     rosuvastatin (CRESTOR) 10 MG tablet, Take 1 tablet (10 mg total) by mouth once daily., Disp: 90 tablet, Rfl: 3  ALLERGIES:   Review of patient's allergies indicates:   Allergen Reactions    Codeine Nausea And Vomiting     REVIEW OF SYSTEMS:  Constitution: Negative. Negative for chills, fever and night sweats. HENT: Negative for congestion and headaches.  Eyes: Negative for blurred vision, left vision loss and right vision loss. Cardiovascular: Negative for chest pain and syncope. Respiratory: Negative for cough and shortness of breath.  Endocrine: Negative for polydipsia, polyphagia and polyuria. Hematologic/Lymphatic: Negative for bleeding problem. Does not bruise/bleed easily. Skin: Negative for dry skin, itching and rash.   Musculoskeletal: Negative for falls. Positive for hand pain and muscle weakness.     PHYSICAL EXAM:  VITAL SIGNS: Wt 94.3 kg (208 lb)   BMI 35.70 kg/m²   General: Well-developed well-nourished 67 y.o. femalein no acute distress;Cardiovascular: Regular rhythm by palpation of distal pulse, normal color and temperature, no concerning varicosities on symptomatic side Lungs: No labored breathing or wheezing appreciated Neuro: Alert and oriented ×3 Psychiatric: well oriented to person, place and time,  demonstrates normal mood and affect Skin: No rashes, lesions or ulcers, normal temperature, turgor, and texture on uninvolved extremity    General    Eyes: EOM are normal.   Cardiovascular: Intact distal pulses.    Neurological: She displays normal reflexes. No cranial nerve deficit. She exhibits normal muscle tone. Coordination normal.     General Musculoskeletal Exam   Pelvic Obliquity: none        Right Hip Exam     Inspection   Deformity of hip.    Range of Motion   Extension: abnormal   Flexion: abnormal   External rotation: abnormal   Internal rotation: abnormal     Tests   Pain w/ forced internal rotation (RADHA): present  Pain w/ forced external rotation (FADIR): present  Circumduction test: positive  Log Roll: positive    Other   Sensation: normal  Back (L-Spine & T-Spine) / Neck (C-Spine) Exam   Back exam is normal.    Back (L-Spine & T-Spine) Range of Motion   The patient has abnormal back ROM.      Vascular Exam     Right Pulses    Posterior Tibial:      2+          Hip abductor strength appears to be 4/5.  Hip flexor strength 4/5    IMAGING:  X-Ray Hip 2 or 3 views Right (with Pelvis when performed)    Result Date: 8/25/2022  See Procedure Notes for results. IMPRESSION: Please see Ortho procedure notes for report.  This procedure was auto-finalized by: Virtual Radiologist    Radiographs right hip were obtained today demonstrating the presence of a right hip arthroplasty with an increase neck length.  The stem appears to be appropriately position with excellent fit and fill no signs of loosening.  Slight leg length inequality with the right lower extremity appearing slightly longer than the left.    ASSESSMENT:      ICD-10-CM ICD-9-CM   1. Right hip pain  M25.551 719.45   2. Status post revision of total hip  Z96.649 V43.64       PLAN:     -Findings and treatment options were discussed with the patient  -All questions answered  Her hip appears to be quite stable.  Not taking any instability and there was  no signs of loosening.  She has weakness with hip abductor strength testing today.  Will order PT for left hip. She is quite weak when assessing hip flexors and hip abductors.  Will focus on this.     Will see back in 6-8 weeks for recheck  There are no Patient Instructions on file for this visit.  No orders of the defined types were placed in this encounter.    Procedures

## 2022-09-12 ENCOUNTER — CLINICAL SUPPORT (OUTPATIENT)
Dept: REHABILITATION | Facility: HOSPITAL | Age: 67
End: 2022-09-12
Attending: ORTHOPAEDIC SURGERY
Payer: MEDICARE

## 2022-09-12 DIAGNOSIS — M25.551 RIGHT HIP PAIN: ICD-10-CM

## 2022-09-12 DIAGNOSIS — R26.9 GAIT DISTURBANCE: ICD-10-CM

## 2022-09-12 PROCEDURE — 97140 MANUAL THERAPY 1/> REGIONS: CPT

## 2022-09-12 PROCEDURE — 97161 PT EVAL LOW COMPLEX 20 MIN: CPT

## 2022-09-12 NOTE — PLAN OF CARE
[facility]  Physical Therapy Initial Evaluation    Name: Lydia Head  Clinic Number: [id number]    Therapy Diagnosis:   Encounter Diagnosis   Name Primary?    Right hip pain      Physician: Mike Cornelius MD    Physician Orders: PT Eval and Treat   Medical Diagnosis from Referral: see above  Evaluation Date: 2022  Authorization Period Expiration: 2022  Plan of Care Expiration: 10/28/2022  Progress Note Due: 10/11/2022  Visit # / Visits authorized: 1/ cap    Time In: 3:24 pm  Time Out: 4:05 pm  Total Appointment Time (timed & untimed codes): 41 minutes    Precautions: Standard    Subjective   Date of onset: several weeks  History of current condition - LYDIA reports: right hip started hurting in July - she denies any injury - she has a history of total hip replacement and revision in  and  - also has some pain on the left side at times - MD note documented LLD with right lower extremity longer -      Medical History:   Past Medical History:   Diagnosis Date    Acquired cyst of kidney     Coronary arteriosclerosis     Encounter for long-term (current) use of other medications     Fatigue     Hx of screening mammography 2018    H[facility] of screening mammography 2021    [facility]    Hyperlipidemia     Hypertension     Multiple joint pain     Nutritional anemia     Vitamin D deficiency        Surgical History:   Lydia Head  has a past surgical history that includes  section (1977); Hysterectomy; Rotator cuff repair (Right); Total replacement of both hip joints using computer-assisted navigation (Right); and Vaginal delivery.    Medications:   Lydia has a current medication list which includes the following prescription(s): aspirin, calcium citrate-vitamin d3 315-200 mg, chlorthalidone, cholecalciferol (vitamin d3), diclofenac sodium, hydroxyzine, ibuprofen, metoprolol succinate, potassium chloride sa, and rosuvastatin.    Allergies:   Review of patient's allergies indicates:    Allergen Reactions    Codeine Nausea And Vomiting        Imaging, xray   Prior Therapy: after hip replacements   Social History:  lives with their family  Occupation: retired  Prior Level of Function: independent   Current Level of Function: independent     Pain:  Current 3/10, worst 8/10, best 0/10   Location: right hip   Description: Aching, Throbbing, and Sharp  Aggravating Factors: Standing and Walking  Easing Factors: lying down, heating pad, hot bath, rest, and Tylenol, Aspercream    Pts goals: get rid of pain in hip    Objective       Range of motion:  Motion Right Left    Hip flexion  WITHIN FUNCTIONAL LIMITS  WITHIN FUNCTIONAL LIMITS   Hip extension   0 degrees  WITHIN FUNCTIONAL LIMITS   Hip abduction  WITHIN FUNCTIONAL LIMITS  WITHIN FUNCTIONAL LIMITS   Hip adduction  WITHIN FUNCTIONAL LIMITS  WITHIN FUNCTIONAL LIMITS   Internal rotation   30 degrees  WITHIN FUNCTIONAL LIMITS   External rotation   30 degrees  WITHIN FUNCTIONAL LIMITS       Manual muscle test   Muscle Right  Left    Hip flexion  MMT strength: 4/5  MMT strength: 5/5   Hip extension  MMT strength: 4-/5  MMT strength: 5/5   Hip abduction  MMT strength: 4-/5  MMT strength: 5/5   Hip adduction  MMT strength: 4+/5  MMT strength: 5/5       Gait:  Weight bearing precautions: FWB  Assistive device: none  Ambulation distance and deviations:  Stairs:  Comments:      Clinical Special Tests:  A. Hip  1. Leg length: right - longer in supine but shortens in long sit   2. Twan test: right Positive   3. Hip scour test: right Negative   4. Piriformis test: right Negative   5. Heide's test: right Positive       Comments:    Limitation/Restriction for FOTO Hip Survey    Therapist reviewed FOTO scores for Hansel Head on 9/12/2022.   FOTO documents entered into [employer] - see Media section.    Intake Score: 36         TREATMENT   Total Treatment time (time-based codes) separate from Evaluation: 15 minutes    HANSEL received the treatments listed below:  Right  SI joint mobilization to correct anteriorly rotated inominate followed by MET's and manual stretching to hip flexors in prone; patient educated on MET's for home along with bridging, hip abduction w/ theraband in hooklying, prone quad and stretching    Home Exercises and Patient Education Provided    Education provided:   - evaluation results, plan of care and home exercise program     Written Home Exercises Provided: yes.  Exercises were reviewed and LYDIA was able to demonstrate them prior to the end of the session.  LYDIA demonstrated good  understanding of the education provided.     See EMR under Patient Instructions for exercises provided 9/12/2022.    Assessment   Lydia is a 67 y.o. female referred to outpatient Physical Therapy with a medical diagnosis of right hip pain. Pt presents with right hip pain for the last several weeks. 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 She was tender over the right PSIS which was higher in standing. 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 Mobilizations and MET's were performed to correct and she was educated on home exercise program.     Pt prognosis is Good.   Pt will benefit from skilled outpatient Physical Therapy to address the deficits stated above and in the chart below, provide pt/family education, and to maximize pt's level of independence.     Plan of care discussed with patient: Yes  Pt's spiritual, cultural and educational needs considered and patient is agreeable to the plan of care and goals as stated below:     Anticipated Barriers for therapy: none      Goals:   Patient will be independent with home exercise program to facilitate carryover between visits.  Patient will maintain neutral pelvis/equal leg length x 3 consecutive visits to decrease pain in right hip.  Patient will have 5/5 strength in right hip for improved  stability with gait and activiites of daily living.  Patient will report < 1/10 in right hip for improved quality of life.  Patient will have normal range of motion right hip for improved mobility.      Plan   Plan of care Certification: 9/12/2022 to 10/28/2022.    Outpatient Physical Therapy 2 times weekly for 6 weeks to include the following interventions: Electrical Stimulation IFC/premod as needed, Gait Training, Manual Therapy, Moist Heat/ Ice, Neuromuscular Re-ed, Patient Education, Therapeutic Activities, Therapeutic Exercise, and Ultrasound.     NINI AZUL, PT

## 2022-09-13 PROBLEM — M25.551 RIGHT HIP PAIN: Status: ACTIVE | Noted: 2022-09-13

## 2022-09-13 PROBLEM — R26.9 GAIT DISTURBANCE: Status: ACTIVE | Noted: 2022-09-13

## 2022-09-14 ENCOUNTER — CLINICAL SUPPORT (OUTPATIENT)
Dept: REHABILITATION | Facility: HOSPITAL | Age: 67
End: 2022-09-14
Attending: ORTHOPAEDIC SURGERY
Payer: MEDICARE

## 2022-09-14 DIAGNOSIS — R26.9 GAIT DISTURBANCE: ICD-10-CM

## 2022-09-14 DIAGNOSIS — M25.551 RIGHT HIP PAIN: Primary | ICD-10-CM

## 2022-09-14 PROCEDURE — 97112 NEUROMUSCULAR REEDUCATION: CPT | Mod: CQ

## 2022-09-14 PROCEDURE — 97140 MANUAL THERAPY 1/> REGIONS: CPT | Mod: CQ

## 2022-09-14 PROCEDURE — 97110 THERAPEUTIC EXERCISES: CPT | Mod: CQ

## 2022-09-14 NOTE — PROGRESS NOTES
"[facility]   Physical Therapy Treatment Note     Name: Lydia Head  [facility] Number: [id number]    Therapy Diagnosis: No diagnosis found.  Physician: Mike Cornelius MD    Visit Date: 9/14/2022    Physician Orders: PT Eval and Treat   Medical Diagnosis from Referral: see above  Evaluation Date: 9/12/2022  Authorization Period Expiration: 12/31/2022  Plan of Care Expiration: 10/28/2022  Progress Note Due: 10/11/2022  Visit # / Visits authorized: 2/ cap    PTA Visit #: 1/5     Time In: 9:57  Time Out: 10:39  Total Billable Time: 42 minutes    SUBJECTIVE     Pt reports: she took ibuprofen yesterday and it is still helping.  She was compliant with home exercise program.  Response to previous treatment: she felt "not bad" after last treatment  Functional change: has only attended evaluation.    Pain: 5-6/10  Location: right hip and leg      OBJECTIVE     Objective Measures updated at progress report unless specified.   Case conference with Maki Olson, PT, for initial PTA visit.     Treatment     LYDIA received the treatments listed below:      therapeutic exercises to develop strength, flexibility, and core stabilization for 12 minutes including:  NuStep x 5 minutes   Slant board x 2 minutes   Standing hamstring stretch 3 x 30 second hold    Bridging 3 x 10    manual therapy techniques: Joint mobilizations, Myofacial release, and Soft tissue Mobilization were applied to the: right lower extremity and lower back for 20 minutes, including:  Prone quad stretches   Prone hip flexor stretches   Long axis distraction  Green roller along low back and glutes    neuromuscular re-education activities to improve: Coordination and Posture for 10 minutes. The following activities were included:  Hooklying hip abduction with blue x 30  Hooklying marches with blue x 10    therapeutic activities to improve functional performance for 0  minutes, including:  (not performed today)       Patient Education " and Home Exercises     Home Exercises Provided and Patient Education Provided     Education provided:   - reviewed home exercise program     Written Home Exercises Provided: Patient instructed to cont prior HEP. Exercises were reviewed and LYDIA was able to demonstrate them prior to the end of the session.  LYDIA demonstrated good  understanding of the education provided. See EMR under Patient Instructions for exercises provided during therapy sessions    ASSESSMENT     Current:  Lydia started today's treatment with 5-6/10 and ended with 4/10. She reported relief after manual work. She was initially very tender with green roller but tolerated it well by end of treatment. She was able to add hooklying march with theraband with cues for pelvic tilt - initially had pain on right side but it decreased with tilt.    At evaluation:  Lydia is a 67 y.o. female referred to outpatient Physical Therapy with a medical diagnosis of right hip pain. Pt presents with right hip pain for the last several weeks. 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 She was tender over the right PSIS which was higher in standing. 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 Mobilizations and MET's were performed to correct and she was educated on home exercise program.      LYDIA Is progressing towards her goals.   Pt prognosis is Good.     Pt will continue to benefit from skilled outpatient physical therapy to address the deficits listed in the problem list box on initial evaluation, provide pt/family education and to maximize pt's level of independence in the home and community environment.     Pt's spiritual, cultural and educational needs considered and pt agreeable to plan of care and goals.     Anticipated barriers to physical therapy: none    Goals:   Patient will be independent with home exercise program to  facilitate carryover between visits.  2.   Patient will maintain neutral pelvis/equal leg length x 3 consecutive visits to decrease pain in right hip.  3.   Patient will have 5/5 strength in right hip for improved stability with gait and activiites of daily living.  4.   Patient will report < 1/10 in right hip for improved quality of life.  5.   Patient will have normal range of motion right hip for improved mobility.    PLAN     Plan of care Certification: 9/12/2022 to 10/28/2022.     Outpatient Physical Therapy 2 times weekly for 6 weeks to include the following interventions: Electrical Stimulation IFC/premod as needed, Gait Training, Manual Therapy, Moist Heat/ Ice, Neuromuscular Re-ed, Patient Education, Therapeutic Activities, Therapeutic Exercise, and Ultrasound.     Jaci Duron, PTA   09/14/2022       100% of the time

## 2022-09-19 ENCOUNTER — CLINICAL SUPPORT (OUTPATIENT)
Dept: REHABILITATION | Facility: HOSPITAL | Age: 67
End: 2022-09-19
Attending: ORTHOPAEDIC SURGERY
Payer: MEDICARE

## 2022-09-19 DIAGNOSIS — M25.551 RIGHT HIP PAIN: Primary | ICD-10-CM

## 2022-09-19 DIAGNOSIS — R26.9 GAIT DISTURBANCE: ICD-10-CM

## 2022-09-19 PROCEDURE — 97112 NEUROMUSCULAR REEDUCATION: CPT | Mod: CQ

## 2022-09-19 PROCEDURE — 97140 MANUAL THERAPY 1/> REGIONS: CPT | Mod: CQ

## 2022-09-19 PROCEDURE — 97110 THERAPEUTIC EXERCISES: CPT | Mod: CQ

## 2022-09-19 NOTE — PROGRESS NOTES
[facility]   Physical Therapy Treatment Note     Name: Lydia Head  Clinic Number: [id number]    Therapy Diagnosis:   Encounter Diagnoses   Name Primary?    Right hip pain Yes    Gait disturbance      Physician: Mike Cornelius MD    Visit Date: 9/19/2022    Physician Orders: PT Eval and Treat   Medical Diagnosis from Referral: see above  Evaluation Date: 9/12/2022  Authorization Period Expiration: 12/31/2022  Plan of Care Expiration: 10/28/2022  Progress Note Due: 10/11/2022  Visit # / Visits authorized: 3/ cap    PTA Visit #: 2/5     Time In: 7:42 am  Time Out: 8:30 am  Total Billable Time: 48 minutes    SUBJECTIVE     Pt reports: no real change. She did not take ibuprofen this morning.  She was compliant with home exercise program.  Response to previous treatment: she felt a little sore after last treatment  Functional change: no change thus far    Pain: 3-4/10  Location: right hip and leg      OBJECTIVE     Objective Measures updated at progress report unless specified.     Treatment     LYDIA received the treatments listed below:      therapeutic exercises to develop strength, flexibility, and core stabilization for 12 minutes including:  NuStep x 6 minutes   Slant board x 2 minutes   Standing hamstring stretch 3 x 30 second hold    Bridging 3 x 10    manual therapy techniques: Joint mobilizations, Myofacial release, and Soft tissue Mobilization were applied to the: right lower extremity and lower back for 20 minutes, including:  Prone quad stretches   Prone hip flexor stretches   Long axis distraction  Green roller along low back and glutes  Theragun along low back and right glute    neuromuscular re-education activities to improve: Coordination and Posture for 16 minutes. The following activities were included:  Hooklying hip abduction with blue x 30  Hooklying marches with blue x 30    therapeutic activities to improve functional performance for 0  minutes, including:  (not  performed today)       Patient Education and Home Exercises     Home Exercises Provided and Patient Education Provided     Education provided:   - reviewed home exercise program     Written Home Exercises Provided: Patient instructed to cont prior HEP. Exercises were reviewed and LYDIA was able to demonstrate them prior to the end of the session.  LYDIA demonstrated good  understanding of the education provided. See EMR under Patient Instructions for exercises provided during therapy sessions    ASSESSMENT     Current:  Lydia started today's treatment with 3-4/10 and ended with 2/10. She continues to report relief after manual work. She is tender with manual work but feels better by end of treatment. She did not complain of increased pain with hooklying march today.     At evaluation:  Lydia is a 67 y.o. female referred to outpatient Physical Therapy with a medical diagnosis of right hip pain. Pt presents with right hip pain for the last several weeks. 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 She was tender over the right PSIS which was higher in standing. 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 Mobilizations and MET's were performed to correct and she was educated on home exercise program.      LYDIA Is progressing towards her goals.   Pt prognosis is Good.     Pt will continue to benefit from skilled outpatient physical therapy to address the deficits listed in the problem list box on initial evaluation, provide pt/family education and to maximize pt's level of independence in the home and community environment.     Pt's spiritual, cultural and educational needs considered and pt agreeable to plan of care and goals.     Anticipated barriers to physical therapy: none    Goals:   Patient will be independent with home exercise program to facilitate carryover between visits.  2.    Patient will maintain neutral pelvis/equal leg length x 3 consecutive visits to decrease pain in right hip.  3.   Patient will have 5/5 strength in right hip for improved stability with gait and activiites of daily living.  4.   Patient will report < 1/10 in right hip for improved quality of life.  5.   Patient will have normal range of motion right hip for improved mobility.    PLAN     Plan of care Certification: 9/12/2022 to 10/28/2022.     Outpatient Physical Therapy 2 times weekly for 6 weeks to include the following interventions: Electrical Stimulation IFC/premod as needed, Gait Training, Manual Therapy, Moist Heat/ Ice, Neuromuscular Re-ed, Patient Education, Therapeutic Activities, Therapeutic Exercise, and Ultrasound.     Jaci Duron, PTA   09/19/2022

## 2022-09-21 ENCOUNTER — CLINICAL SUPPORT (OUTPATIENT)
Dept: REHABILITATION | Facility: HOSPITAL | Age: 67
End: 2022-09-21
Attending: ORTHOPAEDIC SURGERY
Payer: MEDICARE

## 2022-09-21 DIAGNOSIS — R26.9 GAIT DISTURBANCE: ICD-10-CM

## 2022-09-21 DIAGNOSIS — M25.551 RIGHT HIP PAIN: Primary | ICD-10-CM

## 2022-09-21 PROCEDURE — 97112 NEUROMUSCULAR REEDUCATION: CPT | Mod: CQ

## 2022-09-21 PROCEDURE — 97110 THERAPEUTIC EXERCISES: CPT | Mod: CQ

## 2022-09-21 PROCEDURE — 97140 MANUAL THERAPY 1/> REGIONS: CPT

## 2022-09-21 NOTE — PROGRESS NOTES
[facility]   Physical Therapy Treatment Note     Name: Lydia Head  Clinic Number: [id number]    Therapy Diagnosis:   Encounter Diagnoses   Name Primary?    Right hip pain Yes    Gait disturbance      Physician: Mike Cornelius MD    Visit Date: 9/21/2022    Physician Orders: PT Eval and Treat   Medical Diagnosis from Referral: see above  Evaluation Date: 9/12/2022  Authorization Period Expiration: 12/31/2022  Plan of Care Expiration: 10/28/2022  Progress Note Due: 10/11/2022  Visit # / Visits authorized: 4/ cap    PTA Visit #: 3/5     Time In: 9:56 am  Time Out: 10:23 am  Total Billable Time: 24 minutes    SUBJECTIVE     Pt reports: she has not needed ibuprofen the last couple of days.  She was compliant with home exercise program.  Response to previous treatment: she felt a little sore after last treatment  Functional change: no change thus far    Pain: 3-4/10  Location: right hip and leg      OBJECTIVE     Objective Measures updated at progress report unless specified.     Treatment     LYDIA received the treatments listed below:      therapeutic exercises to develop strength, flexibility, and core stabilization for 12 minutes including:  NuStep x 6 minutes   Slant board x 2 minutes   Standing hamstring stretch 3 x 30 second hold      manual therapy techniques: Joint mobilizations, Myofacial release, and Soft tissue Mobilization were applied to the: right lower extremity and lower back for 0 minutes, including:  Prone quad stretches   Prone hip flexor stretches   Long axis distraction  Green roller along low back and glutes  Theragun along low back and right glute    neuromuscular re-education activities to improve: Coordination and Posture for 12 minutes. The following activities were included:  Hooklying hip abduction with bridges with blue x 30  Hooklying marches with blue x 30    therapeutic activities to improve functional performance for 0  minutes, including:  (not  performed today)       Patient Education and Home Exercises     Home Exercises Provided and Patient Education Provided     Education provided:   - reviewed home exercise program     Written Home Exercises Provided: Patient instructed to cont prior HEP. Exercises were reviewed and LYDIA was able to demonstrate them prior to the end of the session.  LYDIA demonstrated good  understanding of the education provided. See EMR under Patient Instructions for exercises provided during therapy sessions    ASSESSMENT     Current:  Lydia reports she is improving and has not been needing the ibuprofen as often. She was able to combine bridges with hip abduction today without complaint. Cotreatment with Thu PT, for second half of treatment.      At evaluation:  Lydia is a 67 y.o. female referred to outpatient Physical Therapy with a medical diagnosis of right hip pain. Pt presents with right hip pain for the last several weeks. 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 She was tender over the right PSIS which was higher in standing. 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 Mobilizations and MET's were performed to correct and she was educated on home exercise program.      LYDIA Is progressing towards her goals.   Pt prognosis is Good.     Pt will continue to benefit from skilled outpatient physical therapy to address the deficits listed in the problem list box on initial evaluation, provide pt/family education and to maximize pt's level of independence in the home and community environment.     Pt's spiritual, cultural and educational needs considered and pt agreeable to plan of care and goals.     Anticipated barriers to physical therapy: none    Goals:   Patient will be independent with home exercise program to facilitate carryover between visits.  2.   Patient will maintain neutral  pelvis/equal leg length x 3 consecutive visits to decrease pain in right hip.  3.   Patient will have 5/5 strength in right hip for improved stability with gait and activiites of daily living.  4.   Patient will report < 1/10 in right hip for improved quality of life.  5.   Patient will have normal range of motion right hip for improved mobility.    PLAN     Plan of care Certification: 9/12/2022 to 10/28/2022.     Outpatient Physical Therapy 2 times weekly for 6 weeks to include the following interventions: Electrical Stimulation IFC/premod as needed, Gait Training, Manual Therapy, Moist Heat/ Ice, Neuromuscular Re-ed, Patient Education, Therapeutic Activities, Therapeutic Exercise, and Ultrasound.     Jaci Duron, PTA   09/21/2022

## 2022-09-21 NOTE — PROGRESS NOTES
[facility]   Physical Therapy Treatment Note     Name: Lydia Head  [facility] Number: [id number]    Therapy Diagnosis:   Encounter Diagnoses   Name Primary?    Right hip pain Yes    Gait disturbance      Physician: Mike Cornelius MD    Visit Date: 9/21/2022    Physician Orders: PT Eval and Treat   Medical Diagnosis from Referral: see above  Evaluation Date: 9/12/2022  Authorization Period Expiration: 12/31/2022  Plan of Care Expiration: 10/28/2022  Progress Note Due: 10/11/2022  Visit # / Visits authorized: 4/ cap    PTA Visit #:     Time In: 10:24 am  Time Out: 10:39 am  Total Billable Time: 12 minutes    SUBJECTIVE     Pt reports: says she is feeling better - has not had to take Tylenol or Ibuprofen the last 2 days  She was compliant with home exercise program.  Response to previous treatment: she felt a little sore after last treatment  Functional change: no change thus far    Pain: 0/10  Location: right hip and leg      OBJECTIVE     Objective Measures updated at progress report unless specified.     Treatment     Cotx with Anel Duron PTA    LYDIA received the treatments listed below:      therapeutic exercises to develop strength, flexibility, and core stabilization for 0 minutes including: -BM, PTA  NuStep x 6 minutes   Slant board x 2 minutes   Standing hamstring stretch 3 x 30 second hold    Bridging 3 x 10    manual therapy techniques: Joint mobilizations, Myofacial release, and Soft tissue Mobilization were applied to the: right lower extremity and lower back for 12 minutes, including: KE, PT  Prone quad stretches   Prone hip flexor stretches   Long axis distraction  Green roller along low back and glutes - NTV  Theragun along low back and right glute    neuromuscular re-education activities to improve: Coordination and Posture for 0 minutes. The following activities were included: BM, PTA  Hooklying hip abduction with blue x 30  Hooklying marches with blue x  30    therapeutic activities to improve functional performance for 0  minutes, including:  (not performed today)       Patient Education and Home Exercises     Home Exercises Provided and Patient Education Provided     Education provided:   - reviewed home exercise program     Written Home Exercises Provided: Patient instructed to cont prior HEP. Exercises were reviewed and LYDIA was able to demonstrate them prior to the end of the session.  LYDIA demonstrated good  understanding of the education provided. See EMR under Patient Instructions for exercises provided during therapy sessions    ASSESSMENT     Current:  Lydia voiced no pain when treatment was taken over from PTA. She had equal leg length in supine. She continues to report relief after manual work. She is tender with manual work but feels better by end of treatment.      At evaluation:  Lydia is a 67 y.o. female referred to outpatient Physical Therapy with a medical diagnosis of right hip pain. Pt presents with right hip pain for the last several weeks. 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 She was tender over the right PSIS which was higher in standing. 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 Mobilizations and MET's were performed to correct and she was educated on home exercise program.      LYDIA Is progressing towards her goals.   Pt prognosis is Good.     Pt will continue to benefit from skilled outpatient physical therapy to address the deficits listed in the problem list box on initial evaluation, provide pt/family education and to maximize pt's level of independence in the home and community environment.     Pt's spiritual, cultural and educational needs considered and pt agreeable to plan of care and goals.     Anticipated barriers to physical therapy: none    Goals:   Patient will be independent with home  exercise program to facilitate carryover between visits.  2.   Patient will maintain neutral pelvis/equal leg length x 3 consecutive visits to decrease pain in right hip.  3.   Patient will have 5/5 strength in right hip for improved stability with gait and activiites of daily living.  4.   Patient will report < 1/10 in right hip for improved quality of life.  5.   Patient will have normal range of motion right hip for improved mobility.    PLAN     Plan of care Certification: 9/12/2022 to 10/28/2022.     Outpatient Physical Therapy 2 times weekly for 6 weeks to include the following interventions: Electrical Stimulation IFC/premod as needed, Gait Training, Manual Therapy, Moist Heat/ Ice, Neuromuscular Re-ed, Patient Education, Therapeutic Activities, Therapeutic Exercise, and Ultrasound.     NINI AZUL, PT   09/21/2022

## 2022-09-26 ENCOUNTER — CLINICAL SUPPORT (OUTPATIENT)
Dept: REHABILITATION | Facility: HOSPITAL | Age: 67
End: 2022-09-26
Attending: ORTHOPAEDIC SURGERY
Payer: MEDICARE

## 2022-09-26 DIAGNOSIS — R26.9 GAIT DISTURBANCE: ICD-10-CM

## 2022-09-26 DIAGNOSIS — M25.551 RIGHT HIP PAIN: Primary | ICD-10-CM

## 2022-09-26 PROCEDURE — 97112 NEUROMUSCULAR REEDUCATION: CPT | Mod: CQ

## 2022-09-26 PROCEDURE — 97110 THERAPEUTIC EXERCISES: CPT | Mod: CQ

## 2022-09-26 NOTE — PROGRESS NOTES
[facility]   Physical Therapy Treatment Note     Name: Hansel Head  Clinic Number: [id number]    Therapy Diagnosis:   Encounter Diagnoses   Name Primary?    Right hip pain Yes    Gait disturbance      Physician: Mike Cornelius MD    Visit Date: 9/26/2022    Physician Orders: PT Eval and Treat   Medical Diagnosis from Referral: see above  Evaluation Date: 9/12/2022  Authorization Period Expiration: 12/31/2022  Plan of Care Expiration: 10/28/2022  Progress Note Due: 10/11/2022  Visit # / Visits authorized: Marizol/ cap    PTA Visit #: 1    Time In: 7:45 am  Time Out: 8:23 am  Total Billable Time: 38 minutes    SUBJECTIVE     Pt reports: says some days she is fine, some days she is not, but she is definitely better than when she started.  She was compliant with home exercise program.  Response to previous treatment: she felt a little sore after last treatment  Functional change: no change thus far    Pain: 0/10  Location: right hip and leg      OBJECTIVE     Objective Measures updated at progress report unless specified.     Treatment     HANSEL received the treatments listed below:      therapeutic exercises to develop strength, flexibility, and core stabilization for 23 minutes including:  NuStep x 6 minutes   Slant board x 2 minutes   Standing hamstring stretch 3 x 30 second hold    Cybex trunk extension with 3 plates x 20  Seated external rotation stretch (modified on right) 3 x 30 second hold each  Bridging     manual therapy techniques: Joint mobilizations, Myofacial release, and Soft tissue Mobilization were applied to the: right lower extremity and lower back for 0 minutes, including:   Prone quad stretches   Prone hip flexor stretches   Long axis distraction  Theragun along low back and right glute    neuromuscular re-education activities to improve: Coordination and Posture for 15 minutes. The following activities were included:   Standing hip abduction with core stabilization x  20  Standing hip flexion with core stabilization x 30  Seated march on theraball x 15    therapeutic activities to improve functional performance for 0  minutes, including:  (not performed today)       Patient Education and Home Exercises     Home Exercises Provided and Patient Education Provided     Education provided:   - reviewed home exercise program     Written Home Exercises Provided: Patient instructed to cont prior HEP. Exercises were reviewed and LYDIA was able to demonstrate them prior to the end of the session.  LYDIA demonstrated good  understanding of the education provided. See EMR under Patient Instructions for exercises provided during therapy sessions    ASSESSMENT     Current:  Lydia was able to increase level of difficulty with exercises today. She did not perform mat exercises but was able to add Cybex trunk extension, standing march and hip abduction, and seated theraball march. She would benefit from continuing to progress these exercises to enhance independence with activities of daily living.      At evaluation:  Lydia is a 67 y.o. female referred to outpatient Physical Therapy with a medical diagnosis of right hip pain. Pt presents with right hip pain for the last several weeks. 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 She was tender over the right PSIS which was higher in standing. 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 Mobilizations and MET's were performed to correct and she was educated on home exercise program.      LYDIA Is progressing towards her goals.   Pt prognosis is Good.     Pt will continue to benefit from skilled outpatient physical therapy to address the deficits listed in the problem list box on initial evaluation, provide pt/family education and to maximize pt's level of independence in the home and community environment.     Pt's  spiritual, cultural and educational needs considered and pt agreeable to plan of care and goals.     Anticipated barriers to physical therapy: none    Goals:   Patient will be independent with home exercise program to facilitate carryover between visits.  2.   Patient will maintain neutral pelvis/equal leg length x 3 consecutive visits to decrease pain in right hip.  3.   Patient will have 5/5 strength in right hip for improved stability with gait and activiites of daily living.  4.   Patient will report < 1/10 in right hip for improved quality of life.  5.   Patient will have normal range of motion right hip for improved mobility.    PLAN     Plan of care Certification: 9/12/2022 to 10/28/2022.     Outpatient Physical Therapy 2 times weekly for 6 weeks to include the following interventions: Electrical Stimulation IFC/premod as needed, Gait Training, Manual Therapy, Moist Heat/ Ice, Neuromuscular Re-ed, Patient Education, Therapeutic Activities, Therapeutic Exercise, and Ultrasound.     Jaci Duron, PTA   09/26/2022

## 2022-09-28 ENCOUNTER — CLINICAL SUPPORT (OUTPATIENT)
Dept: REHABILITATION | Facility: HOSPITAL | Age: 67
End: 2022-09-28
Attending: ORTHOPAEDIC SURGERY
Payer: MEDICARE

## 2022-09-28 DIAGNOSIS — M25.551 RIGHT HIP PAIN: Primary | ICD-10-CM

## 2022-09-28 DIAGNOSIS — R26.9 GAIT DISTURBANCE: ICD-10-CM

## 2022-09-28 PROCEDURE — 97140 MANUAL THERAPY 1/> REGIONS: CPT

## 2022-09-28 PROCEDURE — 97112 NEUROMUSCULAR REEDUCATION: CPT

## 2022-09-28 PROCEDURE — 97110 THERAPEUTIC EXERCISES: CPT

## 2022-09-28 NOTE — PROGRESS NOTES
[facility]   Physical Therapy Treatment Note     Name: Lydia Head  Clinic Number: [id number]    Therapy Diagnosis:   Encounter Diagnoses   Name Primary?    Right hip pain Yes    Gait disturbance      Physician: Mike Cornelius MD    Visit Date: 9/28/2022    Physician Orders: PT Eval and Treat   Medical Diagnosis from Referral: see above  Evaluation Date: 9/12/2022  Authorization Period Expiration: 12/31/2022  Plan of Care Expiration: 10/28/2022  Progress Note Due: 10/11/2022  Visit # / Visits authorized: 6/cap    PTA Visit #:     Time In: 10:00 am  Time Out: 10:45 am  Total Billable Time: 45 minutes    SUBJECTIVE     Pt reports: she is feeling better overall - no pain at the moment  She was compliant with home exercise program.  Response to previous treatment: she felt a little sore after last treatment  Functional change: no change thus far    Pain: 0/10  Location: right hip and leg      OBJECTIVE     Objective Measures updated at progress report unless specified.     Treatment     LYDIA received the treatments listed below:      therapeutic exercises to develop strength, flexibility, and core stabilization for 7 minutes including:  Bike x 5 minutes   Slant board x 2 minutes   Standing hamstring stretch 3 x 30 second hold - w/ manual   Cybex trunk extension with 3 plates --  Seated external rotation stretch (modified on right) 3 x 30 second hold each - w/ manual in supine    manual therapy techniques: Joint mobilizations, Myofacial release, and Soft tissue Mobilization were applied to the: right lower extremity and lower back for 12 minutes, including:   Supine hip external rotation stretches  Supine hamstring stretches  Prone quad stretches   Theraroller along low back, right glute and ililtibial band     neuromuscular re-education activities to improve: Coordination and Posture for 26 minutes. The following activities were included:   Standing hip abduction with core  stabilization 1 plate x 20 each leg   Standing hip flexion with core stabilization 1 plate x 20 each leg   Standing hip extension with core stabilization 1 plate x 20 each leg   Seated march on theraball --  Bridging w/ abduction - blue x 20  Hip adduction squeezes - 10 x 10 second hold     therapeutic activities to improve functional performance for 0  minutes, including:  (not performed today)       Patient Education and Home Exercises     Home Exercises Provided and Patient Education Provided     Education provided:   - reviewed home exercise program     Written Home Exercises Provided: Patient instructed to cont prior HEP. Exercises were reviewed and LYDIA was able to demonstrate them prior to the end of the session.  LYDIA demonstrated good  understanding of the education provided. See EMR under Patient Instructions for exercises provided during therapy sessions    ASSESSMENT     Current:  Lydia was able to increase level of difficulty with exercises today. She was found to be very tender over her piriformis and distal ililtibial band. Will work more manually on these two areas next visit. She would benefit from continuing to progress these exercises to enhance independence with activities of daily living.      At evaluation:  Lydia is a 67 y.o. female referred to outpatient Physical Therapy with a medical diagnosis of right hip pain. Pt presents with right hip pain for the last several weeks. 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 She was tender over the right PSIS which was higher in standing. 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 Mobilizations and MET's were performed to correct and she was educated on home exercise program.      LYDIA Is progressing towards her goals.   Pt prognosis is Good.     Pt will continue to benefit from skilled outpatient physical therapy to  address the deficits listed in the problem list box on initial evaluation, provide pt/family education and to maximize pt's level of independence in the home and community environment.     Pt's spiritual, cultural and educational needs considered and pt agreeable to plan of care and goals.     Anticipated barriers to physical therapy: none    Goals:   Patient will be independent with home exercise program to facilitate carryover between visits.  2.   Patient will maintain neutral pelvis/equal leg length x 3 consecutive visits to decrease pain in right hip.  3.   Patient will have 5/5 strength in right hip for improved stability with gait and activiites of daily living.  4.   Patient will report < 1/10 in right hip for improved quality of life.  5.   Patient will have normal range of motion right hip for improved mobility.    PLAN     Plan of care Certification: 9/12/2022 to 10/28/2022.     Outpatient Physical Therapy 2 times weekly for 6 weeks to include the following interventions: Electrical Stimulation IFC/premod as needed, Gait Training, Manual Therapy, Moist Heat/ Ice, Neuromuscular Re-ed, Patient Education, Therapeutic Activities, Therapeutic Exercise, and Ultrasound.     NINI AZUL, PT   09/28/2022

## 2022-10-03 ENCOUNTER — CLINICAL SUPPORT (OUTPATIENT)
Dept: REHABILITATION | Facility: HOSPITAL | Age: 67
End: 2022-10-03
Attending: ORTHOPAEDIC SURGERY
Payer: MEDICARE

## 2022-10-03 DIAGNOSIS — R26.9 GAIT DISTURBANCE: ICD-10-CM

## 2022-10-03 DIAGNOSIS — M25.551 RIGHT HIP PAIN: Primary | ICD-10-CM

## 2022-10-03 PROCEDURE — 97110 THERAPEUTIC EXERCISES: CPT | Mod: CQ

## 2022-10-03 PROCEDURE — 97112 NEUROMUSCULAR REEDUCATION: CPT | Mod: CQ

## 2022-10-03 NOTE — PROGRESS NOTES
[facility]   Physical Therapy Treatment Note     Name: Hansel Head  Clinic Number: [id number]    Therapy Diagnosis:   Encounter Diagnoses   Name Primary?    Right hip pain Yes    Gait disturbance      Physician: Mike Cornelius MD    Visit Date: 10/3/2022    Physician Orders: PT Eval and Treat   Medical Diagnosis from Referral: see above  Evaluation Date: 9/12/2022  Authorization Period Expiration: 12/31/2022  Plan of Care Expiration: 10/28/2022  Progress Note Due: 10/11/2022  Visit # / Visits authorized: Luz Marina/cap    PTA Visit #: 1    Time In: 10:45 am  Time Out: 10:37 am  Total Billable Time: 48 minutes    SUBJECTIVE     Pt reports: she hurts sometimes, not quite as frequently, but honestly not much less at all. Her right side hurts off and on-sometimes her lower leg hurts instead of the hip. She has pain with single leg stance for things like dressing. She also complains of her hands going numb, the whole hand, not just a few fingers. She walked on the treadmill for 30 minutes this morning.   She was compliant with home exercise program.  Response to previous treatment: she felt a little sore after last treatment  Functional change: no change thus far    Pain: 0/10  Location: right hip and leg      OBJECTIVE     Objective Measures updated at progress report unless specified.     Treatment     HANSEL received the treatments listed below:      therapeutic exercises to develop strength, flexibility, and core stabilization for 10 minutes including:  Bike x 5 minutes   Slant board x 2 minutes   Standing hamstring stretch 3 x 30 second hold - w/ manual   Cybex trunk extension with 3 plates --  Seated external rotation stretch (modified on right) 3 x 30 second hold each - w/ manual in supine  Standing hip abductor stretch with feet together 3 x 30 second hold     manual therapy techniques: Joint mobilizations, Myofacial release, and Soft tissue Mobilization were applied to the: right lower  extremity and lower back for 8 minutes, including:   Supine hip external rotation stretches  Supine hamstring stretches  Prone quad stretches   Theraroller along low back, right glute and ililtibial band (denied need for this today)    neuromuscular re-education activities to improve: Coordination and Posture for 30 minutes. The following activities were included:   Standing hip abduction with core stabilization 1 plate x 20 each leg   Standing hip flexion with core stabilization 1 plate x 20 each leg   Standing hip extension with core stabilization 1 plate x 20 each leg   Bridging w/ abduction - blue x 30  Hip adduction squeezes - 10 x 10 second hold     therapeutic activities to improve functional performance for 0  minutes, including:  (not performed today)       Patient Education and Home Exercises     Home Exercises Provided and Patient Education Provided     Education provided:   - reviewed home exercise program     Written Home Exercises Provided: Patient instructed to cont prior HEP. Exercises were reviewed and LYDIA was able to demonstrate them prior to the end of the session.  LYDIA demonstrated good  understanding of the education provided. See EMR under Patient Instructions for exercises provided during therapy sessions    ASSESSMENT     Current:  Lydia complained of pain with standing hip abduction on right today. Added stretch to address this. She did better after that. She remains tender/sensitive to myofascial release.  She complained of diffuse pain today, numbness and tingling in right hand, pain in right lower leg.She would benefit from continuing to progress these exercises to enhance independence with activities of daily living.      At evaluation:  Lydia is a 67 y.o. female referred to outpatient Physical Therapy with a medical diagnosis of right hip pain. Pt presents with right hip pain for the last several weeks. 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 She was tender  over the right PSIS which was higher in standing. 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 Mobilizations and MET's were performed to correct and she was educated on home exercise program.      HANSEL Is progressing towards her goals.   Pt prognosis is Good.     Pt will continue to benefit from skilled outpatient physical therapy to address the deficits listed in the problem list box on initial evaluation, provide pt/family education and to maximize pt's level of independence in the home and community environment.     Pt's spiritual, cultural and educational needs considered and pt agreeable to plan of care and goals.     Anticipated barriers to physical therapy: none    Goals:   Patient will be independent with home exercise program to facilitate carryover between visits.  2.   Patient will maintain neutral pelvis/equal leg length x 3 consecutive visits to decrease pain in right hip.  3.   Patient will have 5/5 strength in right hip for improved stability with gait and activiites of daily living.  4.   Patient will report < 1/10 in right hip for improved quality of life.  5.   Patient will have normal range of motion right hip for improved mobility.    PLAN     Plan of care Certification: 9/12/2022 to 10/28/2022.     Outpatient Physical Therapy 2 times weekly for 6 weeks to include the following interventions: Electrical Stimulation IFC/premod as needed, Gait Training, Manual Therapy, Moist Heat/ Ice, Neuromuscular Re-ed, Patient Education, Therapeutic Activities, Therapeutic Exercise, and Ultrasound.     Jaci Duron, PTA   10/03/2022

## 2022-10-05 ENCOUNTER — CLINICAL SUPPORT (OUTPATIENT)
Dept: REHABILITATION | Facility: HOSPITAL | Age: 67
End: 2022-10-05
Attending: ORTHOPAEDIC SURGERY
Payer: MEDICARE

## 2022-10-05 DIAGNOSIS — M25.551 RIGHT HIP PAIN: Primary | ICD-10-CM

## 2022-10-05 DIAGNOSIS — R26.9 GAIT DISTURBANCE: ICD-10-CM

## 2022-10-05 PROCEDURE — 97112 NEUROMUSCULAR REEDUCATION: CPT

## 2022-10-05 PROCEDURE — 97140 MANUAL THERAPY 1/> REGIONS: CPT

## 2022-10-05 PROCEDURE — 97110 THERAPEUTIC EXERCISES: CPT

## 2022-10-05 NOTE — PROGRESS NOTES
[facility]   Physical Therapy Treatment Note     Name: Lydia Head  Clinic Number: [id number]    Therapy Diagnosis:   Encounter Diagnoses   Name Primary?    Right hip pain Yes    Gait disturbance      Physician: Mike Cornelius MD    Visit Date: 10/5/2022    Physician Orders: PT Eval and Treat   Medical Diagnosis from Referral: see above  Evaluation Date: 9/12/2022  Authorization Period Expiration: 12/31/2022  Plan of Care Expiration: 10/28/2022  Progress Note Due: 10/11/2022  Visit # / Visits authorized: 8/cap    PTA Visit #:     Time In: 9:50 am  Time Out: 10:43 am  Total Billable Time: 53 minutes    SUBJECTIVE     Pt reports: she says her pain is not bad this morning  She was compliant with home exercise program.  Response to previous treatment: she felt a little sore after last treatment  Functional change: no change thus far    Pain: 0/10  Location: right hip and leg      OBJECTIVE     Objective Measures updated at progress report unless specified.     Treatment     LYDIA received the treatments listed below:      therapeutic exercises to develop strength, flexibility, and core stabilization for 16 minutes including:  Bike x 5 minutes   Slant board x 2 minutes   Standing hamstring stretch 3 x 30 second hold    Cybex trunk extension with 3 plates --  Seated external rotation stretch (modified on right) 3 x 30 second hold each - w/ manual in supine  Standing hip abductor stretch with feet together 3 x 30 second hold - NTV  Seated hamstring curls 4 plates x 30    manual therapy techniques: Joint mobilizations, Myofacial release, and Soft tissue Mobilization were applied to the: right lower extremity and lower back for 12 minutes, including:   Supine hip external rotation stretches  Supine hamstring stretches  Prone quad stretches (NTV)  Gray foam roller along iliotibial band   IASTM with GT5 to distal iliotibial band   MFR distal iliotibial band     neuromuscular re-education  activities to improve: Coordination and Posture for 25 minutes. The following activities were included:   Standing hip abduction with core stabilization 1 plate x 30 each leg   Standing hip flexion with core stabilization 1 plate x 30 each leg   Standing hip extension with core stabilization 1 plate x 30 each leg   Bridging w/ abduction - blue 3 x 10  Hip adduction squeezes - 20 x 10 second hold     therapeutic activities to improve functional performance for 0  minutes, including:  (not performed today)       Patient Education and Home Exercises     Home Exercises Provided and Patient Education Provided     Education provided:   - reviewed home exercise program     Written Home Exercises Provided: Patient instructed to cont prior HEP. Exercises were reviewed and LYDIA was able to demonstrate them prior to the end of the session.  LYDIA demonstrated good  understanding of the education provided. See EMR under Patient Instructions for exercises provided during therapy sessions    ASSESSMENT     Current:  Lydia was able to increase from 20 reps to 30 reps with Cybex multi hip exercises today. She was very tender along distal ITB so foam rolling, IASTM, and MFR was used to address this. Overall she is doing much better with pain and her strength is improving.    At evaluation:  Lydia is a 67 y.o. female referred to outpatient Physical Therapy with a medical diagnosis of right hip pain. Pt presents with right hip pain for the last several weeks. 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 She was tender over the right PSIS which was higher in standing. 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 Mobilizations and MET's were performed to correct and she was educated on home exercise program.      LYDIA Is progressing towards her goals.   Pt prognosis is Good.     Pt will continue to benefit  from skilled outpatient physical therapy to address the deficits listed in the problem list box on initial evaluation, provide pt/family education and to maximize pt's level of independence in the home and community environment.     Pt's spiritual, cultural and educational needs considered and pt agreeable to plan of care and goals.     Anticipated barriers to physical therapy: none    Goals:   Patient will be independent with home exercise program to facilitate carryover between visits.  2.   Patient will maintain neutral pelvis/equal leg length x 3 consecutive visits to decrease pain in right hip.  3.   Patient will have 5/5 strength in right hip for improved stability with gait and activiites of daily living.  4.   Patient will report < 1/10 in right hip for improved quality of life.  5.   Patient will have normal range of motion right hip for improved mobility.    PLAN     Plan of care Certification: 9/12/2022 to 10/28/2022.     Outpatient Physical Therapy 2 times weekly for 6 weeks to include the following interventions: Electrical Stimulation IFC/premod as needed, Gait Training, Manual Therapy, Moist Heat/ Ice, Neuromuscular Re-ed, Patient Education, Therapeutic Activities, Therapeutic Exercise, and Ultrasound.     NINI AZUL, PT   10/05/2022

## 2022-10-10 ENCOUNTER — CLINICAL SUPPORT (OUTPATIENT)
Dept: REHABILITATION | Facility: HOSPITAL | Age: 67
End: 2022-10-10
Attending: ORTHOPAEDIC SURGERY
Payer: MEDICARE

## 2022-10-10 DIAGNOSIS — M25.551 RIGHT HIP PAIN: Primary | ICD-10-CM

## 2022-10-10 DIAGNOSIS — R26.9 GAIT DISTURBANCE: ICD-10-CM

## 2022-10-10 PROCEDURE — 97112 NEUROMUSCULAR REEDUCATION: CPT

## 2022-10-10 PROCEDURE — 97110 THERAPEUTIC EXERCISES: CPT

## 2022-10-10 PROCEDURE — 97140 MANUAL THERAPY 1/> REGIONS: CPT

## 2022-10-10 NOTE — PROGRESS NOTES
"[facility]   Physical Therapy Treatment Note     Name: Lydia Head  [facility] Number: [id number]    Therapy Diagnosis:   No diagnosis found.    Physician: Mike Cornelius MD    Visit Date: 10/10/2022    Physician Orders: PT Eval and Treat   Medical Diagnosis from Referral: see above  Evaluation Date: 9/12/2022  Authorization Period Expiration: 12/31/2022  Plan of Care Expiration: 10/28/2022  Progress Note Due: 10/11/2022  Visit # / Visits authorized: 9/cap    PTA Visit #:     Time In: 1045AM  Time Out: 1148AM  Total Billable Time: 53 minutes      SUBJECTIVE     Pt reports: Still getting intermittent pain radiating down into L LE at this point that "comes and goes".  Really unable to determine what worsens or relieves her pain.    She was compliant with home exercise program.  Response to previous treatment: no increased pain after last tx.  Just soreness after last tx.    Functional change: does feel that when she does her stretches prior to getting out of bed, her pain is lessened first thing in the morning.     Pain: 0/10  Location: right hip and leg      OBJECTIVE     Objective Measures updated at progress report unless specified.     Treatment     LYDIA received the treatments listed below:      therapeutic exercises to develop strength, flexibility, and core stabilization for 25 minutes including:  Bike x 6 minutes   Slant board x 2 minutes   Standing hamstring stretch not performed today  Cybex trunk extension with 3 plates -- not performed today  Seated external rotation stretch (modified on right) Not performed today  Standing hip abductor stretch with feet together 3 x 30 second hold - Not performed today  Seated hamstring curls 4 plates x30 reps Not performed today  Fallout stretch [id number]  Figure 4 stretch [id number] each  Prone quad stretch [id number] each  Twan Stretch [id number] each  Hip Flexor Stretch Standing [id number] each    manual therapy techniques: Joint mobilizations, " Myofacial release, and Soft tissue Mobilization were applied to the: right lower extremity and lower back for 10 minutes, including:   Lateral Hip musculature MFR w/ PROM.    Gentle lateral and PA jt. Mobilizations.     neuromuscular re-education activities to improve: Coordination and Posture for 18 minutes. The following activities were included:   Standing hip abduction with core stabilization Not performed today  Standing hip flexion with core stabilization Not performed today  Standing hip extension with core stabilization 1 plate x 30 each leg   Bridging w/ abduction - blue 3 x 10  Hip adduction squeezes - Not performed today  Long Bridging on Ball [id number]  Prone Hip Extension over Mat 3x5x 5sec holds    therapeutic activities to improve functional performance for 0  minutes, including:  (not performed today)       Patient Education and Home Exercises     Home Exercises Provided and Patient Education Provided     Education provided:   - reviewed home exercise program     Written Home Exercises Provided: Patient instructed to cont prior HEP. Exercises were reviewed and LYDIA was able to demonstrate them prior to the end of the session.  LYDIA demonstrated good  understanding of the education provided. See EMR under Patient Instructions for exercises provided during therapy sessions    ASSESSMENT     Current:  Began tx today with bike warmup followed by manuals on mat and strengthening exercises.  Added in additional hip flexor stretching today with good response.  Continued with posterior chain strengthening issue and pt able to tolerate prone hip extension over the mat today.  Felt good at conclusion of tx.  Will continue with current plan at this time.     At evaluation:  Lydia is a 67 y.o. female referred to outpatient Physical Therapy with a medical diagnosis of right hip pain. Pt presents with right hip pain for the last several weeks. 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 She was  tender over the right PSIS which was higher in standing. 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 Mobilizations and MET's were performed to correct and she was educated on home exercise program.      HANSEL Is progressing towards her goals.   Pt prognosis is Good.     Pt will continue to benefit from skilled outpatient physical therapy to address the deficits listed in the problem list box on initial evaluation, provide pt/family education and to maximize pt's level of independence in the home and community environment.     Pt's spiritual, cultural and educational needs considered and pt agreeable to plan of care and goals.     Anticipated barriers to physical therapy: none    Goals:   Patient will be independent with home exercise program to facilitate carryover between visits.  2.   Patient will maintain neutral pelvis/equal leg length x 3 consecutive visits to decrease pain in right hip.  3.   Patient will have 5/5 strength in right hip for improved stability with gait and activiites of daily living.  4.   Patient will report < 1/10 in right hip for improved quality of life.  5.   Patient will have normal range of motion right hip for improved mobility.    PLAN     Plan of care Certification: 9/12/2022 to 10/28/2022.     Outpatient Physical Therapy 2 times weekly for 6 weeks to include the following interventions: Electrical Stimulation IFC/premod as needed, Gait Training, Manual Therapy, Moist Heat/ Ice, Neuromuscular Re-ed, Patient Education, Therapeutic Activities, Therapeutic Exercise, and Ultrasound.     Jarad Bonilla, PT   10/10/2022

## 2022-10-12 ENCOUNTER — CLINICAL SUPPORT (OUTPATIENT)
Dept: REHABILITATION | Facility: HOSPITAL | Age: 67
End: 2022-10-12
Attending: ORTHOPAEDIC SURGERY
Payer: MEDICARE

## 2022-10-12 DIAGNOSIS — R26.9 GAIT DISTURBANCE: ICD-10-CM

## 2022-10-12 DIAGNOSIS — M25.551 RIGHT HIP PAIN: Primary | ICD-10-CM

## 2022-10-12 PROCEDURE — 97110 THERAPEUTIC EXERCISES: CPT | Mod: CQ

## 2022-10-12 PROCEDURE — 97140 MANUAL THERAPY 1/> REGIONS: CPT | Mod: CQ

## 2022-10-12 NOTE — PROGRESS NOTES
[facility]   Physical Therapy Treatment Note     Name: Lydia Head  [facility] Number: [id number]    Therapy Diagnosis:   No diagnosis found.    Physician: Mike Cornelius MD    Visit Date: 10/12/2022    Physician Orders: PT Eval and Treat   Medical Diagnosis from Referral: see above  Evaluation Date: 9/12/2022  Authorization Period Expiration: 12/31/2022  Plan of Care Expiration: 10/28/2022  Progress Note Due: 10/11/2022  Visit # / Visits authorized: 10/cap    PTA Visit #: 1    Time In: 9:50 AM  Time Out: 10:34 AM  Total Billable Time: 40 minutes      SUBJECTIVE     Pt reports: the only thing that stands out to her for pain relief is stretching. She did her stretches before getting out of bed Sunday and she felt better. She walks on a treadmill for 30 minutes on the days she does not have therapy.   She was compliant with home exercise program.  Response to previous treatment: she was really sore the day after last treatment.  Functional change: does feel that when she does her stretches prior to getting out of bed, her pain is lessened first thing in the morning.     Pain: 0/10  Location: right hip and leg      OBJECTIVE     Objective Measures updated at progress report unless specified.     Treatment     LYDIA received the treatments listed below:      therapeutic exercises to develop strength, flexibility, and core stabilization for 12 minutes including:  NuStep x 6 minutes (patient can ride bike but prefers NuStep)  Slant board x 2 minutes   Standing hamstring stretch  Cybex trunk extension with 3 plates   Seated external rotation stretch (modified on right)   Standing hip abductor stretch with feet together 3 x 30 second hold   Seated hamstring curls 4 plates   Fallout stretch 10 x 10 seconds   Figure 4 stretch   Prone quad stretch   Twan Stretch 10 x 10 seconds each  Hip Flexor Stretch Standing     manual therapy techniques: Joint mobilizations, Myofacial release, and Soft  tissue Mobilization were applied to the: right lower extremity and lower back for 23 minutes, including:   Manual stretching with myofascial release of hip flexors, hip abductors, hip adductors, external rotators and quads    neuromuscular re-education activities to improve: Coordination and Posture for 5 minutes. The following activities were included:   Standing hip abduction with core stabilization   Standing hip flexion with core stabilization   Standing hip extension with core stabilization 1 plate    Bridging w/ abduction - blue 3 x 10  Hip adduction squeezes -   Long Bridging on Ball   Prone Hip Extension over Mat     therapeutic activities to improve functional performance for 0  minutes, including:  (not performed today)       Patient Education and Home Exercises     Home Exercises Provided and Patient Education Provided     Education provided:   - reviewed home exercise program     Written Home Exercises Provided: Patient instructed to cont prior HEP. Exercises were reviewed and LYDIA was able to demonstrate them prior to the end of the session.  LYDIA demonstrated good  understanding of the education provided. See EMR under Patient Instructions for exercises provided during therapy sessions    ASSESSMENT     Current:  Lydia continues to report no significant improvements overall. She feels better after stretching but does not stretch consistently at home. She walks on a treadmill for daily exercise unless she has PT. She did not feel well today - just generalized malaise - and requested not to do all activities. Will continue Plan of Care and progress as able.     At evaluation:  Lydia is a 67 y.o. female referred to outpatient Physical Therapy with a medical diagnosis of right hip pain. Pt presents with right hip pain for the last several weeks. 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 She was tender over the right PSIS which was higher in standing. 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 Mobilizations and MET's were performed to correct and she was educated on home exercise program.      HANSEL Is progressing towards her goals.   Pt prognosis is Good.     Pt will continue to benefit from skilled outpatient physical therapy to address the deficits listed in the problem list box on initial evaluation, provide pt/family education and to maximize pt's level of independence in the home and community environment.     Pt's spiritual, cultural and educational needs considered and pt agreeable to plan of care and goals.     Anticipated barriers to physical therapy: none    Goals:   Patient will be independent with home exercise program to facilitate carryover between visits.  2.   Patient will maintain neutral pelvis/equal leg length x 3 consecutive visits to decrease pain in right hip.  3.   Patient will have 5/5 strength in right hip for improved stability with gait and activiites of daily living.  4.   Patient will report < 1/10 in right hip for improved quality of life.  5.   Patient will have normal range of motion right hip for improved mobility.    PLAN     Plan of care Certification: 9/12/2022 to 10/28/2022.     Outpatient Physical Therapy 2 times weekly for 6 weeks to include the following interventions: Electrical Stimulation IFC/premod as needed, Gait Training, Manual Therapy, Moist Heat/ Ice, Neuromuscular Re-ed, Patient Education, Therapeutic Activities, Therapeutic Exercise, and Ultrasound.     Jaci Duron, PTA   10/12/2022

## 2022-10-13 NOTE — PROGRESS NOTES
"[facility]   Physical Therapy Treatment Note     Name: Lydia Head  Clinic Number: [id number]    Therapy Diagnosis:   Encounter Diagnoses   Name Primary?    Right hip pain Yes    Gait disturbance        Physician: Mike Cornelius MD    Visit Date: 10/10/2022    Physician Orders: PT Eval and Treat   Medical Diagnosis from Referral: see above  Evaluation Date: 9/12/2022  Authorization Period Expiration: 12/31/2022  Plan of Care Expiration: 10/28/2022  Progress Note Due: 10/11/2022  Visit # / Visits authorized: 9/cap    PTA Visit #:     Time In: 1045AM  Time Out: 1148AM  Total Billable Time: 53 minutes      SUBJECTIVE     Pt reports: Still getting intermittent pain radiating down into L LE at this point that "comes and goes".  Really unable to determine what worsens or relieves her pain.    She was compliant with home exercise program.  Response to previous treatment: no increased pain after last tx.  Just soreness after last tx.    Functional change: does feel that when she does her stretches prior to getting out of bed, her pain is lessened first thing in the morning.     Pain: 0/10  Location: right hip and leg      OBJECTIVE     Objective Measures updated at progress report unless specified.     Treatment     LYDIA received the treatments listed below:      therapeutic exercises to develop strength, flexibility, and core stabilization for 25 minutes including:  Bike x 6 minutes   Slant board x 2 minutes   Standing hamstring stretch not performed today  Cybex trunk extension with 3 plates -- not performed today  Seated external rotation stretch (modified on right) Not performed today  Standing hip abductor stretch with feet together 3 x 30 second hold - Not performed today  Seated hamstring curls 4 plates x30 reps Not performed today  Fallout stretch [id number]  Figure 4 stretch [id number] each  Prone quad stretch [id number] each  Twan Stretch 1m75uht each  Hip Flexor Stretch Standing " [id number] each    manual therapy techniques: Joint mobilizations, Myofacial release, and Soft tissue Mobilization were applied to the: right lower extremity and lower back for 10 minutes, including:   Lateral Hip musculature MFR w/ PROM.    Gentle lateral and PA jt. Mobilizations.     neuromuscular re-education activities to improve: Coordination and Posture for 18 minutes. The following activities were included:   Standing hip abduction with core stabilization Not performed today  Standing hip flexion with core stabilization Not performed today  Standing hip extension with core stabilization 1 plate x 30 each leg   Bridging w/ abduction - blue 3 x 10  Hip adduction squeezes - Not performed today  Long Bridging on Ball [id number]  Prone Hip Extension over Mat 3x5x 5sec holds    therapeutic activities to improve functional performance for 0  minutes, including:  (not performed today)       Patient Education and Home Exercises     Home Exercises Provided and Patient Education Provided     Education provided:   - reviewed home exercise program     Written Home Exercises Provided: Patient instructed to cont prior HEP. Exercises were reviewed and LYDIA was able to demonstrate them prior to the end of the session.  LYDIA demonstrated good  understanding of the education provided. See EMR under Patient Instructions for exercises provided during therapy sessions    ASSESSMENT     Current:  Began tx today with bike warmup followed by manuals on mat and strengthening exercises.  Added in additional hip flexor stretching today with good response.  Continued with posterior chain strengthening issue and pt able to tolerate prone hip extension over the mat today.  Felt good at conclusion of tx.  Will continue with current plan at this time.     At evaluation:  Lydia is a 67 y.o. female referred to outpatient Physical Therapy with a medical diagnosis of right hip pain. Pt presents with right hip pain for the last several weeks.  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 She was tender over the right PSIS which was higher in standing. 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 Mobilizations and MET's were performed to correct and she was educated on home exercise program.      HANSEL Is progressing towards her goals.   Pt prognosis is Good.     Pt will continue to benefit from skilled outpatient physical therapy to address the deficits listed in the problem list box on initial evaluation, provide pt/family education and to maximize pt's level of independence in the home and community environment.     Pt's spiritual, cultural and educational needs considered and pt agreeable to plan of care and goals.     Anticipated barriers to physical therapy: none    Goals:   Patient will be independent with home exercise program to facilitate carryover between visits.  2.   Patient will maintain neutral pelvis/equal leg length x 3 consecutive visits to decrease pain in right hip.  3.   Patient will have 5/5 strength in right hip for improved stability with gait and activiites of daily living.  4.   Patient will report < 1/10 in right hip for improved quality of life.  5.   Patient will have normal range of motion right hip for improved mobility.    PLAN     Plan of care Certification: 9/12/2022 to 10/28/2022.     Outpatient Physical Therapy 2 times weekly for 6 weeks to include the following interventions: Electrical Stimulation IFC/premod as needed, Gait Training, Manual Therapy, Moist Heat/ Ice, Neuromuscular Re-ed, Patient Education, Therapeutic Activities, Therapeutic Exercise, and Ultrasound.     Jarad Bonilla, PT   10/13/2022

## 2022-10-13 NOTE — PLAN OF CARE
"[facility]   Physical Therapy Progress Note    Name: Lydia Head  [facility] Number: [id number]    Therapy Diagnosis:   Encounter Diagnoses   Name Primary?    Right hip pain Yes    Gait disturbance        Physician: Mike Cornelius MD    Visit Date: 10/10/2022    Physician Orders: PT Eval and Treat   Medical Diagnosis from Referral: see above  Evaluation Date: 9/12/2022  Authorization Period Expiration: 12/31/2022  Plan of Care Expiration: 10/28/2022  Progress Note Due: 10/28/2022  Visit # / Visits authorized: 9/cap    PTA Visit #:     Time In: 1045AM  Time Out: 1148AM  Total Billable Time: 53 minutes      SUBJECTIVE     Pt reports: Still getting intermittent pain radiating down into L LE at this point that "comes and goes".  Really unable to determine what worsens or relieves her pain.    She was compliant with home exercise program.  Response to previous treatment: no increased pain after last tx.  Just soreness after last tx.    Functional change: does feel that when she does her stretches prior to getting out of bed, her pain is lessened first thing in the morning.     Pain: 0/10  Location: right hip and leg      OBJECTIVE     ROM Grossly WNL at this point.    Strength 3+/5 globally with poor endurance.      Quad Flexibility  mod restricted.      Treatment     LYDIA received the treatments listed below:      therapeutic exercises to develop strength, flexibility, and core stabilization for 25 minutes including:  Bike x 6 minutes   Slant board x 2 minutes   Standing hamstring stretch not performed today  Cybex trunk extension with 3 plates -- not performed today  Seated external rotation stretch (modified on right) Not performed today  Standing hip abductor stretch with feet together 3 x 30 second hold - Not performed today  Seated hamstring curls 4 plates x30 reps Not performed today  Fallout stretch [id number]  Figure 4 stretch [id number] each  Prone quad stretch [id number] each  Twan " Stretch [id number] each  Hip Flexor Stretch Standing [id number] each    manual therapy techniques: Joint mobilizations, Myofacial release, and Soft tissue Mobilization were applied to the: right lower extremity and lower back for 10 minutes, including:   Lateral Hip musculature MFR w/ PROM.    Gentle lateral and PA jt. Mobilizations.     neuromuscular re-education activities to improve: Coordination and Posture for 18 minutes. The following activities were included:   Standing hip abduction with core stabilization Not performed today  Standing hip flexion with core stabilization Not performed today  Standing hip extension with core stabilization 1 plate x 30 each leg   Bridging w/ abduction - blue 3 x 10  Hip adduction squeezes - Not performed today  Long Bridging on Ball [id number]  Prone Hip Extension over Mat 3x5x 5sec holds    therapeutic activities to improve functional performance for 0  minutes, including:  (not performed today)       Patient Education and Home Exercises     Home Exercises Provided and Patient Education Provided     Education provided:   - reviewed home exercise program     Written Home Exercises Provided: Patient instructed to cont prior HEP. Exercises were reviewed and LYDIA was able to demonstrate them prior to the end of the session.  LYDIA demonstrated good  understanding of the education provided. See EMR under Patient Instructions for exercises provided during therapy sessions    ASSESSMENT     Current:  Began tx today with bike warmup followed by manuals on mat and strengthening exercises.  Added in additional hip flexor stretching today with good response.  Continued with posterior chain strengthening issue and pt able to tolerate prone hip extension over the mat today.  Felt good at conclusion of tx.  Will continue with current plan at this time.     At evaluation:  Lydia is a 67 y.o. female referred to outpatient Physical Therapy with a medical diagnosis of right hip pain. Pt  presents with right hip pain for the last several weeks. 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 She was tender over the right PSIS which was higher in standing. 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 Mobilizations and MET's were performed to correct and she was educated on home exercise program.      HANSEL Is progressing towards her goals.   Pt prognosis is Good.     Pt will continue to benefit from skilled outpatient physical therapy to address the deficits listed in the problem list box on initial evaluation, provide pt/family education and to maximize pt's level of independence in the home and community environment.     Pt's spiritual, cultural and educational needs considered and pt agreeable to plan of care and goals.     Anticipated barriers to physical therapy: none    Goals:   Patient will be independent with home exercise program to facilitate carryover between visits.  MET  2.   Patient will maintain neutral pelvis/equal leg length x 3 consecutive visits to decrease pain in right hip. MET  3.   Patient will have 5/5 strength in right hip for improved stability with gait and activiites of daily living.  NOT MET   4.   Patient will report < 1/10 in right hip for improved quality of life.  NOT MET  5.   Patient will have normal range of motion right hip for improved mobility.  NOT MET    PLAN     Plan of care Certification: 9/12/2022 to 10/28/2022.     Outpatient Physical Therapy 2 times weekly for 6 weeks to include the following interventions: Electrical Stimulation IFC/premod as needed, Gait Training, Manual Therapy, Moist Heat/ Ice, Neuromuscular Re-ed, Patient Education, Therapeutic Activities, Therapeutic Exercise, and Ultrasound.     Jarad Bonilla, PT   10/13/2022

## 2022-10-17 ENCOUNTER — CLINICAL SUPPORT (OUTPATIENT)
Dept: REHABILITATION | Facility: HOSPITAL | Age: 67
End: 2022-10-17
Attending: ORTHOPAEDIC SURGERY
Payer: MEDICARE

## 2022-10-17 DIAGNOSIS — R26.9 GAIT DISTURBANCE: ICD-10-CM

## 2022-10-17 DIAGNOSIS — M25.551 RIGHT HIP PAIN: Primary | ICD-10-CM

## 2022-10-17 PROCEDURE — 97112 NEUROMUSCULAR REEDUCATION: CPT | Mod: CQ

## 2022-10-17 PROCEDURE — 97110 THERAPEUTIC EXERCISES: CPT | Mod: CQ

## 2022-10-17 NOTE — PROGRESS NOTES
[facility]   Physical Therapy Treatment Note     Name: Lydia Head  [facility] Number: [id number]    Therapy Diagnosis:   Encounter Diagnoses   Name Primary?    Right hip pain Yes    Gait disturbance        Physician: Mike Cornelius MD    Visit Date: 10/17/2022    Physician Orders: PT Eval and Treat   Medical Diagnosis from Referral: see above  Evaluation Date: 9/12/2022  Authorization Period Expiration: 12/31/2022  Plan of Care Expiration: 10/28/2022  Progress Note Due: 10/11/2022  Visit # / Visits authorized: 11/cap    PTA Visit #: 2    Time In: 10:00 AM  Time Out: 10:42 AM  Total Billable Time: 40 minutes      SUBJECTIVE     Pt reports: she is feeling pretty good today.  She was compliant with home exercise program.  Response to previous treatment: she was really sore the day after last treatment.  Functional change: does feel that when she does her stretches prior to getting out of bed, her pain is lessened first thing in the morning.     Pain: 0/10  Location: right hip and leg      OBJECTIVE     Objective Measures updated at progress report unless specified.     Treatment     LYDIA received the treatments listed below:      therapeutic exercises to develop strength, flexibility, and core stabilization for 15 minutes including:  NuStep x 6 minutes (patient can ride bike but prefers NuStep)  Slant board x 2 minutes   Standing hamstring stretch 3 x 30 second hold   Cybex trunk extension with 3 plates x 30  Seated external rotation stretch (modified on right)   Standing hip abductor stretch with feet together    Seated hamstring curls 4 plates x 30  Fallout stretch    Figure 4 stretch   Prone quad stretch   Twan Stretch 10 x 10 seconds each  Hip Flexor Stretch Standing     manual therapy techniques: Joint mobilizations, Myofacial release, and Soft tissue Mobilization were applied to the: right lower extremity and lower back for 0 minutes, including:   Manual stretching with  myofascial release of hip flexors, hip abductors, hip adductors, external rotators and quads    neuromuscular re-education activities to improve: Coordination and Posture for 25 minutes. The following activities were included:   Standing hip abduction with core stabilization with 2 plates x 20 each  Standing hip flexion with core stabilization with 2 plates x 20 each   Standing hip extension with core stabilization 2 plates x 30 each    Bridging w/ abduction - blue   Hip adduction squeezes -   Long Bridging on Ball   Prone Hip Extension over Mat     therapeutic activities to improve functional performance for 0  minutes, including:  (not performed today)       Patient Education and Home Exercises     Home Exercises Provided and Patient Education Provided     Education provided:   - reviewed home exercise program     Written Home Exercises Provided: Patient instructed to cont prior HEP. Exercises were reviewed and LYDIA was able to demonstrate them prior to the end of the session.  LYDIA demonstrated good  understanding of the education provided. See EMR under Patient Instructions for exercises provided during therapy sessions    ASSESSMENT     Current:  Lydia reported feeling pretty good on arrival today. She did well with exercises and even requested an increase in weight on cybex hip machine. She complained of some soreness in knee with right hip flexion exercise.     At evaluation:  Lydia is a 67 y.o. female referred to outpatient Physical Therapy with a medical diagnosis of right hip pain. Pt presents with right hip pain for the last several weeks. 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 She was tender over the right PSIS which was higher in standing. 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 Mobilizations and MET's were performed to correct and she was educated on home exercise  program.      HANSEL Is progressing towards her goals.   Pt prognosis is Good.     Pt will continue to benefit from skilled outpatient physical therapy to address the deficits listed in the problem list box on initial evaluation, provide pt/family education and to maximize pt's level of independence in the home and community environment.     Pt's spiritual, cultural and educational needs considered and pt agreeable to plan of care and goals.     Anticipated barriers to physical therapy: none    Goals:   Patient will be independent with home exercise program to facilitate carryover between visits.  2.   Patient will maintain neutral pelvis/equal leg length x 3 consecutive visits to decrease pain in right hip.  3.   Patient will have 5/5 strength in right hip for improved stability with gait and activiites of daily living.  4.   Patient will report < 1/10 in right hip for improved quality of life.  5.   Patient will have normal range of motion right hip for improved mobility.    PLAN     Plan of care Certification: 9/12/2022 to 10/28/2022.     Outpatient Physical Therapy 2 times weekly for 6 weeks to include the following interventions: Electrical Stimulation IFC/premod as needed, Gait Training, Manual Therapy, Moist Heat/ Ice, Neuromuscular Re-ed, Patient Education, Therapeutic Activities, Therapeutic Exercise, and Ultrasound.     Jaci Duron, PTA   10/17/2022

## 2022-10-19 ENCOUNTER — CLINICAL SUPPORT (OUTPATIENT)
Dept: REHABILITATION | Facility: HOSPITAL | Age: 67
End: 2022-10-19
Attending: ORTHOPAEDIC SURGERY
Payer: MEDICARE

## 2022-10-19 DIAGNOSIS — R26.9 GAIT DISTURBANCE: ICD-10-CM

## 2022-10-19 DIAGNOSIS — M25.551 RIGHT HIP PAIN: Primary | ICD-10-CM

## 2022-10-19 PROCEDURE — 97110 THERAPEUTIC EXERCISES: CPT | Mod: CQ

## 2022-10-19 PROCEDURE — 97112 NEUROMUSCULAR REEDUCATION: CPT | Mod: CQ

## 2022-10-19 NOTE — PROGRESS NOTES
[facility]   Physical Therapy Treatment Note     Name: Lydia Head  [facility] Number: [id number]    Therapy Diagnosis:   Encounter Diagnoses   Name Primary?    Right hip pain Yes    Gait disturbance        Physician: Mike Cornelius MD    Visit Date: 10/19/2022    Physician Orders: PT Eval and Treat   Medical Diagnosis from Referral: see above  Evaluation Date: 9/12/2022  Authorization Period Expiration: 12/31/2022  Plan of Care Expiration: 10/28/2022  Progress Note Due: 10/11/2022  Visit # / Visits authorized: 12/cap    PTA Visit #: 3    Time In: 9:54 AM  Time Out: 10:44 AM  Total Billable Time: 45 minutes      SUBJECTIVE     Pt reports: she is feeling pretty good today. She believes she is slowly improving.  She was compliant with home exercise program.  Response to previous treatment: she was really sore the day after last treatment.  Functional change: does feel that when she does her stretches prior to getting out of bed, her pain is lessened first thing in the morning.     Pain: 0/10  Location: right hip and leg      OBJECTIVE     Objective Measures updated at progress report unless specified.     Treatment     LYDIA received the treatments listed below:      therapeutic exercises to develop strength, flexibility, and core stabilization for 25 minutes including:  NuStep x 6 minutes (patient can ride bike but prefers NuStep)  Slant board x 2 minutes   Standing hamstring stretch 3 x 30 second hold   Cybex trunk extension with 3 plates x 30  Seated theraball rollout to left for relief on right 5 x 10 second hold    Standing hip abductor stretch with feet together 3 x 15 second hold    Seated hamstring curls 4 plates   Hip Flexor Stretch Standing 3 x 30 second hold     manual therapy techniques: Joint mobilizations, Myofacial release, and Soft tissue Mobilization were applied to the: right lower extremity and lower back for 0 minutes, including:   Manual stretching with myofascial  release of hip flexors, hip abductors, hip adductors, external rotators and quads    neuromuscular re-education activities to improve: Coordination and Posture for 20 minutes. The following activities were included:   Standing hip abduction with core stabilization with 2 plates x 20 each  Standing hip flexion with core stabilization with 2 plates x 20 each   Standing hip extension with core stabilization 2 plates x 20 each    Bridging w/ abduction - blue   Hip adduction squeezes -   Long Bridging on Ball   Prone Hip Extension over Mat     therapeutic activities to improve functional performance for 0  minutes, including:  (not performed today)       Patient Education and Home Exercises     Home Exercises Provided and Patient Education Provided     Education provided:   - reviewed home exercise program     Written Home Exercises Provided: Patient instructed to cont prior HEP. Exercises were reviewed and LYDIA was able to demonstrate them prior to the end of the session.  LYDIA demonstrated good  understanding of the education provided. See EMR under Patient Instructions for exercises provided during therapy sessions    ASSESSMENT     Current:  Lydia reported feeling pretty good again today. She was able to do exercises on multihip machine without any complaints. She needs frequent cueing to correct exercises and has difficulty remembering what to do initially. She felt a good stretch with seated theraball rollout to left.     At evaluation:  Lydia is a 67 y.o. female referred to outpatient Physical Therapy with a medical diagnosis of right hip pain. Pt presents with right hip pain for the last several weeks. 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 She was tender over the right PSIS which was higher in standing. 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 Mobilizations and MET's were  performed to correct and she was educated on home exercise program.      HANSEL Is progressing towards her goals.   Pt prognosis is Good.     Pt will continue to benefit from skilled outpatient physical therapy to address the deficits listed in the problem list box on initial evaluation, provide pt/family education and to maximize pt's level of independence in the home and community environment.     Pt's spiritual, cultural and educational needs considered and pt agreeable to plan of care and goals.     Anticipated barriers to physical therapy: none    Goals:   Patient will be independent with home exercise program to facilitate carryover between visits.  2.   Patient will maintain neutral pelvis/equal leg length x 3 consecutive visits to decrease pain in right hip.  3.   Patient will have 5/5 strength in right hip for improved stability with gait and activiites of daily living.  4.   Patient will report < 1/10 in right hip for improved quality of life.  5.   Patient will have normal range of motion right hip for improved mobility.    PLAN     Plan of care Certification: 9/12/2022 to 10/28/2022.     Outpatient Physical Therapy 2 times weekly for 6 weeks to include the following interventions: Electrical Stimulation IFC/premod as needed, Gait Training, Manual Therapy, Moist Heat/ Ice, Neuromuscular Re-ed, Patient Education, Therapeutic Activities, Therapeutic Exercise, and Ultrasound.     Jaci Duron, PTA   10/19/2022

## 2022-10-20 ENCOUNTER — OFFICE VISIT (OUTPATIENT)
Dept: ORTHOPEDICS | Facility: CLINIC | Age: 67
End: 2022-10-20
Payer: MEDICARE

## 2022-10-20 DIAGNOSIS — Z96.641 STATUS POST TOTAL HIP REPLACEMENT, RIGHT: Primary | ICD-10-CM

## 2022-10-20 PROCEDURE — 99212 OFFICE O/P EST SF 10 MIN: CPT | Mod: ,,, | Performed by: ORTHOPAEDIC SURGERY

## 2022-10-20 PROCEDURE — 99212 PR OFFICE/OUTPT VISIT, EST, LEVL II, 10-19 MIN: ICD-10-PCS | Mod: ,,, | Performed by: ORTHOPAEDIC SURGERY

## 2022-10-20 NOTE — PROGRESS NOTES
67 y.o. Female returns to clinic for a follow up visit regarding     ICD-10-CM ICD-9-CM   1. Status post total hip replacement, right  Z96.641 V43.64        Pt  is doing better today, she is going to therapy twice a week which has helped, states she still having shooting pain that runs down her leg.       Past Medical History:   Diagnosis Date    Acquired cyst of kidney     Coronary arteriosclerosis     Encounter for long-term (current) use of other medications     Fatigue     Hx of screening mammography 2018    Hx of screening mammography 2021    [facility]    Hyperlipidemia     Hypertension     Multiple joint pain     Nutritional anemia     Vitamin D deficiency      Past Surgical History:   Procedure Laterality Date     SECTION      HYSTERECTOMY      ROTATOR CUFF REPAIR Right     procedure done twice per Dr. Jim Cornelius.    TOTAL REPLACEMENT OF BOTH HIP JOINTS USING COMPUTER-ASSISTED NAVIGATION Right     Performed by Dr. Jim Cornelius.    VAGINAL DELIVERY      x 2         REVIEW OF SYSTEMS:   All other review of symptoms were reviewed and found to be noncontributory.     PHYSICAL EXAMINATION:  There were no vitals taken for this visit.  General    Nursing note and vitals reviewed.  HENT:   Nose: Nose normal.   Eyes: EOM are normal.   Neck: Neck supple.   Abdominal: She exhibits no distension.   Psychiatric: Judgment normal.     General Musculoskeletal Exam   Gait: Trendelenburg         Muscle Strength   Right Lower Extremity   Hip Abduction: 4/5       IMAGING:  No results found.     ASSESSMENT:      ICD-10-CM ICD-9-CM   1. Status post total hip replacement, right  Z96.641 V43.64       PLAN:     -Findings and treatment options were discussed with the patient  -All questions answered      She has some degree of trochanteric bursitis but physical therapy has been helpful in alleviating a lot of her pain symptoms.  Will therefore allow her to continue her home exercises and a stretching  program was demonstrated and reviewed with the patient.  She voiced her understanding.  Will have her follow up on an as-needed basis    There are no Patient Instructions on file for this visit.      No orders of the defined types were placed in this encounter.        Procedures

## 2022-10-24 ENCOUNTER — CLINICAL SUPPORT (OUTPATIENT)
Dept: REHABILITATION | Facility: HOSPITAL | Age: 67
End: 2022-10-24
Attending: ORTHOPAEDIC SURGERY
Payer: MEDICARE

## 2022-10-24 DIAGNOSIS — R26.9 GAIT DISTURBANCE: ICD-10-CM

## 2022-10-24 DIAGNOSIS — M25.551 RIGHT HIP PAIN: Primary | ICD-10-CM

## 2022-10-24 PROCEDURE — 97110 THERAPEUTIC EXERCISES: CPT | Mod: CQ

## 2022-10-24 PROCEDURE — 97112 NEUROMUSCULAR REEDUCATION: CPT | Mod: CQ

## 2022-10-24 NOTE — PROGRESS NOTES
[facility]   Physical Therapy Treatment Note     Name: Lydia Head  Clinic Number: [id number]    Therapy Diagnosis:   Encounter Diagnoses   Name Primary?    Right hip pain Yes    Gait disturbance        Physician: Mike Cornelius MD    Visit Date: 10/24/2022    Physician Orders: PT Eval and Treat   Medical Diagnosis from Referral: see above  Evaluation Date: 9/12/2022  Authorization Period Expiration: 12/31/2022  Plan of Care Expiration: 10/28/2022  Progress Note Due: 10/11/2022  Visit # / Visits authorized: 13/cap    PTA Visit #: 4    Time In: 9:55 AM  Time Out: 10:38 AM  Total Billable Time: 40 minutes      SUBJECTIVE     Pt reports: she saw Dr. Mckeon yesterday and he released her. She has already walked on the treadmill this morning and is feeling pretty good.   She was compliant with home exercise program.  Response to previous treatment: no complaint   Functional change: does feel that when she does her stretches prior to getting out of bed, her pain is lessened first thing in the morning.     Pain: 0/10  Location: right hip and leg      OBJECTIVE     Objective Measures updated at progress report unless specified.     Treatment     LYDIA received the treatments listed below:      therapeutic exercises to develop strength, flexibility, and core stabilization for 23 minutes including:  NuStep x 6 minutes (patient can ride bike but prefers NuStep)  Slant board x 2 minutes   Standing hamstring stretch 3 x 30 second hold   Cybex trunk extension with 3 plates x 30  Seated theraball rollout to left for relief on right 5 x 10 second hold    Standing hip abductor stretch with feet together 3 x 15 second hold    Seated hamstring curls 4 plates x 20  Hip Flexor Stretch Standing 3 x 30 second hold     manual therapy techniques: Joint mobilizations, Myofacial release, and Soft tissue Mobilization were applied to the: right lower extremity and lower back for 0 minutes, including:   Manual  stretching with myofascial release of hip flexors, hip abductors, hip adductors, external rotators and quads    neuromuscular re-education activities to improve: Coordination and Posture for 17 minutes. The following activities were included:   Standing hip abduction with core stabilization with 2 plates x 20 each  Standing hip flexion with core stabilization with 2 plates x 20 each   Standing hip extension with core stabilization 2 plates x 20 each      therapeutic activities to improve functional performance for 0  minutes, including:  (not performed today)       Patient Education and Home Exercises     Home Exercises Provided and Patient Education Provided     Education provided:   - reviewed home exercise program     Written Home Exercises Provided: Patient instructed to cont prior HEP. Exercises were reviewed and LYDIA was able to demonstrate them prior to the end of the session.  LYDIA demonstrated good  understanding of the education provided. See EMR under Patient Instructions for exercises provided during therapy sessions    ASSESSMENT     Current:  Lydia reported feeling pretty good again today. She felt ready to discharge to home exercise program, especially after talking to Dr Mckeon. Reviewed exercises and added seated trunk stretch.     At evaluation:  Lydia is a 67 y.o. female referred to outpatient Physical Therapy with a medical diagnosis of right hip pain. Pt presents with right hip pain for the last several weeks. 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 She was tender over the right PSIS which was higher in standing. 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 Mobilizations and MET's were performed to correct and she was educated on home exercise program.      LYDIA Is progressing towards her goals.   Pt prognosis is Good.     Pt will continue to benefit from  skilled outpatient physical therapy to address the deficits listed in the problem list box on initial evaluation, provide pt/family education and to maximize pt's level of independence in the home and community environment.     Pt's spiritual, cultural and educational needs considered and pt agreeable to plan of care and goals.     Anticipated barriers to physical therapy: none    Goals:   Patient will be independent with home exercise program to facilitate carryover between visits.  2.   Patient will maintain neutral pelvis/equal leg length x 3 consecutive visits to decrease pain in right hip.  3.   Patient will have 5/5 strength in right hip for improved stability with gait and activiites of daily living.  4.   Patient will report < 1/10 in right hip for improved quality of life.  5.   Patient will have normal range of motion right hip for improved mobility.    PLAN     Discharge to home exercise program with patient independent and functional with all activities of daily living.    Jaci Duron, PTA   10/24/2022

## 2022-11-03 ENCOUNTER — OFFICE VISIT (OUTPATIENT)
Dept: FAMILY MEDICINE | Facility: CLINIC | Age: 67
End: 2022-11-03
Payer: MEDICARE

## 2022-11-03 VITALS
OXYGEN SATURATION: 96 % | RESPIRATION RATE: 18 BRPM | SYSTOLIC BLOOD PRESSURE: 110 MMHG | HEART RATE: 91 BPM | WEIGHT: 196 LBS | HEIGHT: 64 IN | DIASTOLIC BLOOD PRESSURE: 70 MMHG | BODY MASS INDEX: 33.46 KG/M2 | TEMPERATURE: 98 F

## 2022-11-03 DIAGNOSIS — E78.5 HYPERLIPIDEMIA, UNSPECIFIED HYPERLIPIDEMIA TYPE: ICD-10-CM

## 2022-11-03 DIAGNOSIS — I10 ESSENTIAL HYPERTENSION, BENIGN: ICD-10-CM

## 2022-11-03 DIAGNOSIS — G56.02 CARPAL TUNNEL SYNDROME OF LEFT WRIST: Primary | ICD-10-CM

## 2022-11-03 DIAGNOSIS — Z79.899 ENCOUNTER FOR LONG-TERM (CURRENT) USE OF OTHER MEDICATIONS: ICD-10-CM

## 2022-11-03 DIAGNOSIS — L81.9 SKIN HYPOPIGMENTATION: ICD-10-CM

## 2022-11-03 DIAGNOSIS — R73.9 HYPERGLYCEMIA: ICD-10-CM

## 2022-11-03 DIAGNOSIS — Z23 NEED FOR VACCINATION: ICD-10-CM

## 2022-11-03 DIAGNOSIS — E55.9 VITAMIN D DEFICIENCY: ICD-10-CM

## 2022-11-03 LAB
25(OH)D3 SERPL-MCNC: 41 NG/ML
ALBUMIN SERPL BCP-MCNC: 4.2 G/DL (ref 3.5–5)
ALBUMIN/GLOB SERPL: 1.1 {RATIO}
ALP SERPL-CCNC: 80 U/L (ref 55–142)
ALT SERPL W P-5'-P-CCNC: 33 U/L (ref 13–56)
ANION GAP SERPL CALCULATED.3IONS-SCNC: 11 MMOL/L (ref 7–16)
AST SERPL W P-5'-P-CCNC: 21 U/L (ref 15–37)
BASOPHILS # BLD AUTO: 0.04 K/UL (ref 0–0.2)
BASOPHILS NFR BLD AUTO: 0.9 % (ref 0–1)
BILIRUB SERPL-MCNC: 0.3 MG/DL (ref ?–1.2)
BUN SERPL-MCNC: 18 MG/DL (ref 7–18)
BUN/CREAT SERPL: 16 (ref 6–20)
CALCIUM SERPL-MCNC: 9.5 MG/DL (ref 8.5–10.1)
CHLORIDE SERPL-SCNC: 105 MMOL/L (ref 98–107)
CHOLEST SERPL-MCNC: 210 MG/DL (ref 0–200)
CHOLEST/HDLC SERPL: 3.7 {RATIO}
CO2 SERPL-SCNC: 29 MMOL/L (ref 21–32)
CREAT SERPL-MCNC: 1.14 MG/DL (ref 0.55–1.02)
DIFFERENTIAL METHOD BLD: ABNORMAL
EGFR (NO RACE VARIABLE) (RUSH/TITUS): 54 ML/MIN/1.73M²
EOSINOPHIL # BLD AUTO: 0.07 K/UL (ref 0–0.5)
EOSINOPHIL NFR BLD AUTO: 1.7 % (ref 1–4)
ERYTHROCYTE [DISTWIDTH] IN BLOOD BY AUTOMATED COUNT: 14.6 % (ref 11.5–14.5)
EST. AVERAGE GLUCOSE BLD GHB EST-MCNC: 110 MG/DL
GLOBULIN SER-MCNC: 3.8 G/DL (ref 2–4)
GLUCOSE SERPL-MCNC: 89 MG/DL (ref 74–106)
HBA1C MFR BLD HPLC: 5.9 % (ref 4.5–6.6)
HCT VFR BLD AUTO: 42.8 % (ref 38–47)
HDLC SERPL-MCNC: 57 MG/DL (ref 40–60)
HGB BLD-MCNC: 14.2 G/DL (ref 12–16)
IMM GRANULOCYTES # BLD AUTO: 0 K/UL (ref 0–0.04)
IMM GRANULOCYTES NFR BLD: 0 % (ref 0–0.4)
LDLC SERPL CALC-MCNC: 122 MG/DL
LDLC/HDLC SERPL: 2.1 {RATIO}
LYMPHOCYTES # BLD AUTO: 1.88 K/UL (ref 1–4.8)
LYMPHOCYTES NFR BLD AUTO: 44.4 % (ref 27–41)
MAGNESIUM SERPL-MCNC: 2.5 MG/DL (ref 1.7–2.3)
MCH RBC QN AUTO: 26.6 PG (ref 27–31)
MCHC RBC AUTO-ENTMCNC: 33.2 G/DL (ref 32–36)
MCV RBC AUTO: 80.3 FL (ref 80–96)
MONOCYTES # BLD AUTO: 0.37 K/UL (ref 0–0.8)
MONOCYTES NFR BLD AUTO: 8.7 % (ref 2–6)
MPC BLD CALC-MCNC: 9.4 FL (ref 9.4–12.4)
NEUTROPHILS # BLD AUTO: 1.87 K/UL (ref 1.8–7.7)
NEUTROPHILS NFR BLD AUTO: 44.3 % (ref 53–65)
NONHDLC SERPL-MCNC: 153 MG/DL
NRBC # BLD AUTO: 0 X10E3/UL
NRBC, AUTO (.00): 0 %
PLATELET # BLD AUTO: 365 K/UL (ref 150–400)
POTASSIUM SERPL-SCNC: 3.4 MMOL/L (ref 3.5–5.1)
PROT SERPL-MCNC: 8 G/DL (ref 6.4–8.2)
RBC # BLD AUTO: 5.33 M/UL (ref 4.2–5.4)
SODIUM SERPL-SCNC: 142 MMOL/L (ref 136–145)
T4 FREE SERPL-MCNC: 0.94 NG/DL (ref 0.76–1.46)
TRIGL SERPL-MCNC: 156 MG/DL (ref 35–150)
TSH SERPL DL<=0.005 MIU/L-ACNC: 1.52 UIU/ML (ref 0.36–3.74)
VLDLC SERPL-MCNC: 31 MG/DL
WBC # BLD AUTO: 4.23 K/UL (ref 4.5–11)

## 2022-11-03 PROCEDURE — G0008 ADMIN INFLUENZA VIRUS VAC: HCPCS | Mod: ,,, | Performed by: FAMILY MEDICINE

## 2022-11-03 PROCEDURE — 80053 COMPREHENSIVE METABOLIC PANEL: ICD-10-PCS | Mod: ,,, | Performed by: CLINICAL MEDICAL LABORATORY

## 2022-11-03 PROCEDURE — 83735 ASSAY OF MAGNESIUM: CPT | Mod: ,,, | Performed by: CLINICAL MEDICAL LABORATORY

## 2022-11-03 PROCEDURE — 84439 T4, FREE: ICD-10-PCS | Mod: ,,, | Performed by: CLINICAL MEDICAL LABORATORY

## 2022-11-03 PROCEDURE — 90694 VACC AIIV4 NO PRSRV 0.5ML IM: CPT | Mod: ,,, | Performed by: FAMILY MEDICINE

## 2022-11-03 PROCEDURE — 99213 PR OFFICE/OUTPT VISIT, EST, LEVL III, 20-29 MIN: ICD-10-PCS | Mod: ,,, | Performed by: FAMILY MEDICINE

## 2022-11-03 PROCEDURE — 83036 HEMOGLOBIN A1C: ICD-10-PCS | Mod: ,,, | Performed by: CLINICAL MEDICAL LABORATORY

## 2022-11-03 PROCEDURE — 80053 COMPREHEN METABOLIC PANEL: CPT | Mod: ,,, | Performed by: CLINICAL MEDICAL LABORATORY

## 2022-11-03 PROCEDURE — 84439 ASSAY OF FREE THYROXINE: CPT | Mod: ,,, | Performed by: CLINICAL MEDICAL LABORATORY

## 2022-11-03 PROCEDURE — G0008 FLU VACCINE - QUADRIVALENT - ADJUVANTED: ICD-10-PCS | Mod: ,,, | Performed by: FAMILY MEDICINE

## 2022-11-03 PROCEDURE — 84443 TSH: ICD-10-PCS | Mod: ,,, | Performed by: CLINICAL MEDICAL LABORATORY

## 2022-11-03 PROCEDURE — 82306 VITAMIN D 25 HYDROXY: CPT | Mod: ,,, | Performed by: CLINICAL MEDICAL LABORATORY

## 2022-11-03 PROCEDURE — 99213 OFFICE O/P EST LOW 20 MIN: CPT | Mod: ,,, | Performed by: FAMILY MEDICINE

## 2022-11-03 PROCEDURE — 90694 FLU VACCINE - QUADRIVALENT - ADJUVANTED: ICD-10-PCS | Mod: ,,, | Performed by: FAMILY MEDICINE

## 2022-11-03 PROCEDURE — 80061 LIPID PANEL: ICD-10-PCS | Mod: ,,, | Performed by: CLINICAL MEDICAL LABORATORY

## 2022-11-03 PROCEDURE — 80061 LIPID PANEL: CPT | Mod: ,,, | Performed by: CLINICAL MEDICAL LABORATORY

## 2022-11-03 PROCEDURE — 82306 VITAMIN D: ICD-10-PCS | Mod: ,,, | Performed by: CLINICAL MEDICAL LABORATORY

## 2022-11-03 PROCEDURE — 85025 CBC WITH DIFFERENTIAL: ICD-10-PCS | Mod: ,,, | Performed by: CLINICAL MEDICAL LABORATORY

## 2022-11-03 PROCEDURE — 83735 MAGNESIUM: ICD-10-PCS | Mod: ,,, | Performed by: CLINICAL MEDICAL LABORATORY

## 2022-11-03 PROCEDURE — 84443 ASSAY THYROID STIM HORMONE: CPT | Mod: ,,, | Performed by: CLINICAL MEDICAL LABORATORY

## 2022-11-03 PROCEDURE — 85025 COMPLETE CBC W/AUTO DIFF WBC: CPT | Mod: ,,, | Performed by: CLINICAL MEDICAL LABORATORY

## 2022-11-03 PROCEDURE — 83036 HEMOGLOBIN GLYCOSYLATED A1C: CPT | Mod: ,,, | Performed by: CLINICAL MEDICAL LABORATORY

## 2022-11-03 NOTE — PROGRESS NOTES
Subjective:       Patient ID: Lydia Head is a 67 y.o. female.    Chief Complaint: Hand Pain and Wrist Pain    Having pain, numbness/tingling in left hand and wrist.  Denies injury.    Review of Systems   Constitutional:  Negative for appetite change, chills, fatigue, fever and unexpected weight change.   Respiratory:  Negative for cough and shortness of breath.    Cardiovascular:  Negative for chest pain and leg swelling.   Gastrointestinal:  Negative for abdominal pain.   Musculoskeletal:  Positive for arthralgias.   Integumentary:  Negative for rash.   Neurological:  Positive for numbness. Negative for weakness.   Psychiatric/Behavioral:  The patient is not nervous/anxious.        Objective:      Physical Exam  Constitutional:       General: She is not in acute distress.     Appearance: Normal appearance.   Cardiovascular:      Rate and Rhythm: Normal rate and regular rhythm.      Heart sounds: Normal heart sounds.   Pulmonary:      Breath sounds: Normal breath sounds.   Abdominal:      General: Abdomen is flat.      Palpations: Abdomen is soft.   Musculoskeletal:      Comments: Positive Tinel sign   Skin:     General: Skin is warm and dry.   Neurological:      Mental Status: She is alert. Mental status is at baseline.   Psychiatric:         Mood and Affect: Mood normal.         Behavior: Behavior normal.         Thought Content: Thought content normal.         Judgment: Judgment normal.       Assessment:       1. Carpal tunnel syndrome of left wrist        2. Encounter for long-term (current) use of other medications  CBC Auto Differential    Comprehensive Metabolic Panel    Hemoglobin A1C    Lipid Panel    Magnesium    Vitamin D    CBC Auto Differential    Comprehensive Metabolic Panel    Hemoglobin A1C    Lipid Panel    Magnesium    Vitamin D      3. Hyperglycemia  Comprehensive Metabolic Panel    Hemoglobin A1C    Comprehensive Metabolic Panel    Hemoglobin A1C      4. Essential hypertension, benign  CBC  Auto Differential    Comprehensive Metabolic Panel    Magnesium    T4, Free    TSH    CBC Auto Differential    Comprehensive Metabolic Panel    Magnesium    T4, Free    TSH      5. Vitamin D deficiency  Vitamin D    Vitamin D      6. Hyperlipidemia, unspecified hyperlipidemia type  CBC Auto Differential    Comprehensive Metabolic Panel    Lipid Panel    T4, Free    TSH    CBC Auto Differential    Comprehensive Metabolic Panel    Lipid Panel    T4, Free    TSH      7. Need for vaccination  Influenza (FLUAD) - Quadrivalent (Adjuvanted) *Preferred* (65+) (PF)      8. Skin hypopigmentation  Ambulatory referral/consult to Dermatology          Plan:       Wrist splint, NSAIDs sparingly, consider ortho referral  Dr. España--skin concerns  Labs  Flu vacc

## 2022-11-09 ENCOUNTER — DOCUMENTATION ONLY (OUTPATIENT)
Dept: REHABILITATION | Facility: HOSPITAL | Age: 67
End: 2022-11-09
Payer: MEDICARE

## 2022-11-09 PROBLEM — M25.551 RIGHT HIP PAIN: Status: RESOLVED | Noted: 2022-09-13 | Resolved: 2022-11-09

## 2022-11-09 PROBLEM — R26.9 GAIT DISTURBANCE: Status: RESOLVED | Noted: 2022-09-13 | Resolved: 2022-11-09

## 2022-11-09 NOTE — PROGRESS NOTES
[facility]   Physical Therapy Discharge Summary     Name: Lydia Head  [facility] Number: [id number]     Therapy Diagnosis:        Encounter Diagnoses   Name Primary?    Right hip pain Yes    Gait disturbance           Physician: Mike Cornelius MD     Last Visit Date: 10/24/2022     Physician Orders: PT Eval and Treat   Medical Diagnosis from Referral: see above  Evaluation Date: 9/12/2022  Authorization Period Expiration: 12/31/2022  Plan of Care Expiration: 10/28/2022  Progress Note Due: 10/11/2022  Visit # / Visits authorized: 13/cap    Assessment      Goals:   Patient will be independent with home exercise program to facilitate carryover between visits. MET  2.   Patient will maintain neutral pelvis/equal leg length x 3 consecutive visits to decrease pain in right hip. MET  3.   Patient will have 5/5 strength in right hip for improved stability with gait and activiites of daily living. MET  4.   Patient will report < 1/10 in right hip for improved quality of life. MET  5.   Patient will have normal range of motion right hip for improved mobility. MET    Discharge reason: Patient has met all of his/her goals. She has been released by MD and is good continuing at home.     Plan   This patient is discharged from Physical Therapy.    NINI AZUL, PT  11/9/2022

## 2023-03-09 DIAGNOSIS — Z71.89 COMPLEX CARE COORDINATION: ICD-10-CM

## 2023-06-20 ENCOUNTER — OFFICE VISIT (OUTPATIENT)
Dept: FAMILY MEDICINE | Facility: CLINIC | Age: 68
End: 2023-06-20
Payer: MEDICARE

## 2023-06-20 VITALS
HEIGHT: 64 IN | DIASTOLIC BLOOD PRESSURE: 80 MMHG | TEMPERATURE: 98 F | BODY MASS INDEX: 34.31 KG/M2 | HEART RATE: 70 BPM | SYSTOLIC BLOOD PRESSURE: 126 MMHG | RESPIRATION RATE: 16 BRPM | OXYGEN SATURATION: 96 % | WEIGHT: 201 LBS

## 2023-06-20 DIAGNOSIS — R13.14 PHARYNGOESOPHAGEAL DYSPHAGIA: Primary | ICD-10-CM

## 2023-06-20 DIAGNOSIS — K21.9 GASTROESOPHAGEAL REFLUX DISEASE WITHOUT ESOPHAGITIS: ICD-10-CM

## 2023-06-20 PROCEDURE — 99213 OFFICE O/P EST LOW 20 MIN: CPT | Mod: ,,, | Performed by: FAMILY MEDICINE

## 2023-06-20 PROCEDURE — 99213 PR OFFICE/OUTPT VISIT, EST, LEVL III, 20-29 MIN: ICD-10-PCS | Mod: ,,, | Performed by: FAMILY MEDICINE

## 2023-06-20 RX ORDER — ESOMEPRAZOLE MAGNESIUM 40 MG/1
40 CAPSULE, DELAYED RELEASE ORAL
Qty: 90 CAPSULE | Refills: 3 | Status: SHIPPED | OUTPATIENT
Start: 2023-06-20 | End: 2023-06-23

## 2023-06-20 NOTE — PROGRESS NOTES
Jose Antoine MD        PATIENT NAME: Lydia Head  : 1955  DATE: 23  MRN: [id number]      Billing Provider: Jose Antoine MD  Level of Service: AL OFFICE/OUTPT VISIT, EST, LEVL III, 20-29 MIN  Patient PCP Information       Provider PCP Type    Marcie Portillo MD General            Reason for Visit / Chief Complaint: food sticking (Food sticking in throat has to drink water to get it down), Fatigue, and Constipation       Update PCP  Update Chief Complaint         History of Present Illness / Problem Focused Workflow     Lydia Head presents to the clinic with food sticking (Food sticking in throat has to drink water to get it down), Fatigue, and Constipation     Patient complains of several week history of food sticking in the upper esophagus.  Also constipation off and on.    Fatigue  Associated symptoms include fatigue. Pertinent negatives include no abdominal pain, arthralgias, chest pain, congestion, coughing, fever, headaches, nausea, neck pain, rash, sore throat or weakness (global weakness).   Constipation  Pertinent negatives include no abdominal pain, diarrhea, difficulty urinating, fever or nausea.   Review of Systems     Review of Systems   Constitutional:  Positive for fatigue. Negative for activity change, appetite change, fever and unexpected weight change.   HENT:  Negative for congestion, rhinorrhea, sinus pressure, sinus pain, sore throat and trouble swallowing.    Eyes:  Negative for photophobia, pain, discharge and visual disturbance.   Respiratory:  Negative for cough, chest tightness, wheezing and stridor.    Cardiovascular:  Negative for chest pain, palpitations and leg swelling.   Gastrointestinal:  Positive for constipation. Negative for abdominal pain, blood in stool, diarrhea and nausea.   Endocrine: Negative for polydipsia, polyphagia and polyuria.   Genitourinary:  Negative for difficulty urinating, flank pain and hematuria.   Musculoskeletal:  Negative for  arthralgias and neck pain.   Skin:  Negative for rash.   Allergic/Immunologic: Negative for food allergies.   Neurological:  Negative for dizziness, tremors, seizures, syncope, weakness (global weakness) and headaches.   Psychiatric/Behavioral:  Negative for behavioral problems, confusion, decreased concentration, dysphoric mood and hallucinations. The patient is not nervous/anxious.       Medical / Social / Family History     Past Medical History:   Diagnosis Date    Acquired cyst of kidney     Coronary arteriosclerosis     Encounter for long-term (current) use of other medications     Fatigue     Hx of screening mammography 2018    Hx of screening mammography 2021    [facility]    Hyperlipidemia     Hypertension     Multiple joint pain     Nutritional anemia     Vitamin D deficiency        Past Surgical History:   Procedure Laterality Date     SECTION      HYSTERECTOMY      ROTATOR CUFF REPAIR Right     procedure done twice per Dr. Jim Cornelius.    TOTAL REPLACEMENT OF BOTH HIP JOINTS USING COMPUTER-ASSISTED NAVIGATION Right     Performed by Dr. Jim Cornelius.    VAGINAL DELIVERY      x 2       Social History  Ms.  reports that she has never smoked. She has never used smokeless tobacco. She reports that she does not drink alcohol and does not use drugs.    Family History  Ms.'s family history includes Alzheimer's disease in her father; Asthma in her sister; Breast cancer in her mother, sister, and sister; COPD in her brother and brother; Diabetes in her father and mother; Lung cancer in her brother and mother; No Known Problems in her brother, daughter, sister, sister, son, and son.    Medications and Allergies     Medications  No outpatient medications have been marked as taking for the 23 encounter (Office Visit) with Jose Antoine MD.       Allergies  Review of patient's allergies indicates:   Allergen Reactions    Codeine Nausea And Vomiting       Physical Examination     Vitals:     06/20/23 1051   BP: 126/80   Pulse: 70   Resp: 16   Temp: 98.1 °F (36.7 °C)     Physical Exam  Constitutional:       General: She is not in acute distress.     Appearance: Normal appearance.   HENT:      Head: Normocephalic.      Right Ear: Tympanic membrane and ear canal normal.      Left Ear: Tympanic membrane and ear canal normal.      Nose: Nose normal.      Mouth/Throat:      Mouth: Mucous membranes are moist.      Pharynx: No oropharyngeal exudate.   Eyes:      Extraocular Movements: Extraocular movements intact.      Pupils: Pupils are equal, round, and reactive to light.   Cardiovascular:      Rate and Rhythm: Normal rate and regular rhythm.      Heart sounds: No murmur heard.  Pulmonary:      Effort: Pulmonary effort is normal.      Breath sounds: Normal breath sounds. No wheezing.   Abdominal:      General: Abdomen is flat. Bowel sounds are normal.      Palpations: Abdomen is soft.      Hernia: No hernia is present.   Musculoskeletal:         General: Normal range of motion.      Cervical back: Normal range of motion and neck supple.      Right lower leg: No edema.      Left lower leg: No edema.   Lymphadenopathy:      Cervical: No cervical adenopathy.   Skin:     General: Skin is warm and dry.      Coloration: Skin is not jaundiced.      Findings: No lesion.   Neurological:      General: No focal deficit present.      Mental Status: She is alert and oriented to person, place, and time.      Cranial Nerves: No cranial nerve deficit.      Gait: Gait normal.   Psychiatric:         Mood and Affect: Mood normal.         Behavior: Behavior normal.         Judgment: Judgment normal.        Assessment and Plan (including Health Maintenance)      Problem List  Smart Sets  Document Outside HM   :    Plan:           Health Maintenance Due   Topic Date Due    Hepatitis C Screening  Never done    COVID-19 Vaccine (1) Never done    Diabetes Urine Screening  Never done    Pneumococcal Vaccines (Age 65+) (1 - PCV) Never  done    Foot Exam  Never done    Eye Exam  Never done    HIV Screening  Never done    TETANUS VACCINE  Never done    DEXA Scan  Never done    Mammogram  11/22/2022    Hemoglobin A1c  05/03/2023       Problem List Items Addressed This Visit    None  Visit Diagnoses       Pharyngoesophageal dysphagia    -  Primary    Relevant Orders    Ambulatory referral/consult to Gastroenterology    Gastroesophageal reflux disease without esophagitis        Relevant Medications    esomeprazole (NEXIUM) 40 MG capsule            Health Maintenance Topics with due status: Not Due       Topic Last Completion Date    Colorectal Cancer Screening 12/11/2019    Aspirin/Antiplatelet Therapy 11/03/2022    Lipid Panel 11/03/2022       Future Appointments   Date Time Provider Department Center   6/23/2023  8:15 AM ANGIE Randhawa [facility]        There are no Patient Instructions on file for this visit.  Follow up if symptoms worsen or fail to improve.     Signature:  Jose Antoine MD      Date of encounter: 6/20/23

## 2023-06-23 ENCOUNTER — OFFICE VISIT (OUTPATIENT)
Dept: GASTROENTEROLOGY | Facility: CLINIC | Age: 68
End: 2023-06-23
Payer: MEDICARE

## 2023-06-23 ENCOUNTER — TELEPHONE (OUTPATIENT)
Dept: GASTROENTEROLOGY | Facility: CLINIC | Age: 68
End: 2023-06-23
Payer: MEDICARE

## 2023-06-23 VITALS
WEIGHT: 204 LBS | SYSTOLIC BLOOD PRESSURE: 132 MMHG | HEIGHT: 64 IN | DIASTOLIC BLOOD PRESSURE: 82 MMHG | BODY MASS INDEX: 34.83 KG/M2

## 2023-06-23 DIAGNOSIS — K59.00 CONSTIPATION, UNSPECIFIED CONSTIPATION TYPE: ICD-10-CM

## 2023-06-23 DIAGNOSIS — R13.10 DYSPHAGIA, UNSPECIFIED TYPE: Primary | ICD-10-CM

## 2023-06-23 DIAGNOSIS — K76.0 FATTY LIVER: ICD-10-CM

## 2023-06-23 PROCEDURE — 99214 OFFICE O/P EST MOD 30 MIN: CPT | Mod: PBBFAC | Performed by: NURSE PRACTITIONER

## 2023-06-23 PROCEDURE — 99214 PR OFFICE/OUTPT VISIT, EST, LEVL IV, 30-39 MIN: ICD-10-PCS | Mod: S$PBB,,, | Performed by: NURSE PRACTITIONER

## 2023-06-23 PROCEDURE — 99214 OFFICE O/P EST MOD 30 MIN: CPT | Mod: S$PBB,,, | Performed by: NURSE PRACTITIONER

## 2023-06-23 RX ORDER — FAMOTIDINE 20 MG/1
20 TABLET, FILM COATED ORAL 2 TIMES DAILY
Qty: 60 TABLET | Refills: 2 | Status: SHIPPED | OUTPATIENT
Start: 2023-06-23 | End: 2023-08-22

## 2023-06-23 NOTE — PROGRESS NOTES
Lydia Head is a 67 y.o. female here for Dysphagia        PCP: Marcie Portillo  Referring Provider: Jose Antoine Md  [address] [facility] [facility]  [place] [address]     HPI:  Presents for report of dysphagia.  Patient is reporting dysphagia with solid food.  She is also reporting epigastric discomfort and tenderness as well as reflux.  Reports that she was prescribed Nexium and took it only 1 time.  States that it caused her chest to feel funny.  No nausea or vomiting.  No hematochezia or melena.  No weight loss.  This is a new onset reflux and dysphagia.  No previous EGD.  She was evaluated by Cardiology, Dr. Marin yesterday.  Also reports some chest wall tenderness.  Review of record shows an abdominal ultrasound that did indicate fatty liver on 09/30/2020.  We discussed repeating abdominal ultrasound.  Last colonoscopy was 12/11/2019, recommendation to repeat in 10 years.  Reports constipation.  States that she was advised to take MiraLax but has not yet started.  Advised that she should take MiraLax 17 g in 8 oz of liquid daily.  Patient states she will start today.        ROS:  Review of Systems   Constitutional:  Negative for appetite change, fatigue, fever and unexpected weight change.   HENT:  Positive for trouble swallowing.    Cardiovascular:  Negative for chest pain.   Gastrointestinal:  Positive for constipation and reflux. Negative for abdominal pain, blood in stool, diarrhea, nausea and vomiting.   Musculoskeletal:  Negative for gait problem.   Integumentary:  Negative for pallor.   Psychiatric/Behavioral:  The patient is not nervous/anxious.         PMHX:  has a past medical history of Acquired cyst of kidney, Coronary arteriosclerosis, Encounter for long-term (current) use of other medications, Fatigue, screening mammography (09/07/2018), screening mammography (11/22/2021), Hyperlipidemia, Hypertension, Multiple joint pain, Nutritional anemia, and Vitamin D  deficiency.    PSHX:  has a past surgical history that includes  section (); Hysterectomy; Rotator cuff repair (Right); Total replacement of both hip joints using computer-assisted navigation (Right); and Vaginal delivery.    PFHX: family history includes Alzheimer's disease in her father; Asthma in her sister; Breast cancer in her mother, sister, and sister; COPD in her brother and brother; Diabetes in her father and mother; Lung cancer in her brother and mother; No Known Problems in her brother, daughter, sister, sister, son, and son.    PSlHX:  reports that she has never smoked. She has never used smokeless tobacco. She reports that she does not drink alcohol and does not use drugs.        Review of patient's allergies indicates:   Allergen Reactions    Codeine Nausea And Vomiting       Medication List with Changes/Refills   New Medications    FAMOTIDINE (PEPCID) 20 MG TABLET    Take 1 tablet (20 mg total) by mouth 2 (two) times daily.   Current Medications    ASPIRIN (ECOTRIN) 81 MG EC TABLET    Take 81 mg by mouth once daily.    CALCIUM CITRATE-VITAMIN D3 315-200 MG (CITRACAL+D) 315 MG-5 MCG (200 UNIT) PER TABLET    Take 2 tablets by mouth once daily.    CHLORTHALIDONE (HYGROTEN) 25 MG TAB    1/2 tab by mouth daily    CHOLECALCIFEROL, VITAMIN D3, (VITAMIN D3) 50 MCG (2,000 UNIT) CAP    Take 1 capsule by mouth once daily.    DICLOFENAC SODIUM (VOLTAREN) 1 % GEL    Apply 4 g topically 4 (four) times daily.    HYDROXYZINE (ATARAX) 50 MG TABLET    Take 1 tablet (50 mg total) by mouth nightly as needed (insomnia).    IBUPROFEN (ADVIL,MOTRIN) 600 MG TABLET    Take 1 tablet (600 mg total) by mouth every 6 (six) hours as needed for Pain.    METOPROLOL SUCCINATE (TOPROL-XL) 25 MG 24 HR TABLET    Take 1 tablet by mouth once daily.    POTASSIUM CHLORIDE SA (K-DUR,KLOR-CON) 20 MEQ TABLET    Take 1 tablet by mouth 2 (two) times a day.    ROSUVASTATIN (CRESTOR) 10 MG TABLET    Take 1 tablet (10 mg total) by mouth  "once daily.   Discontinued Medications    ESOMEPRAZOLE (NEXIUM) 40 MG CAPSULE    Take 1 capsule (40 mg total) by mouth before breakfast.        Objective Findings:  Vital Signs:  /82   Ht 5' 4" (1.626 m)   Wt 92.5 kg (204 lb)   BMI 35.02 kg/m²  Body mass index is 35.02 kg/m².    Physical Exam:  Physical Exam  Vitals and nursing note reviewed.   Constitutional:       General: She is not in acute distress.     Appearance: Normal appearance.   HENT:      Mouth/Throat:      Mouth: Mucous membranes are moist.   Cardiovascular:      Rate and Rhythm: Normal rate.   Pulmonary:      Effort: Pulmonary effort is normal.      Breath sounds: No wheezing, rhonchi or rales.   Abdominal:      General: Bowel sounds are normal. There is no distension.      Palpations: Abdomen is soft. There is no mass.      Tenderness: There is no abdominal tenderness. There is no guarding.   Skin:     General: Skin is warm and dry.      Coloration: Skin is not jaundiced or pale.   Neurological:      Mental Status: She is alert and oriented to person, place, and time.   Psychiatric:         Mood and Affect: Mood normal.        Labs:  Lab Results   Component Value Date    WBC 4.23 (L) 11/03/2022    HGB 14.2 11/03/2022    HCT 42.8 11/03/2022    MCV 80.3 11/03/2022    RDW 14.6 (H) 11/03/2022     11/03/2022    LYMPH 44.4 (H) 11/03/2022    LYMPH 1.88 11/03/2022    MONO 8.7 (H) 11/03/2022    EOS 0.07 11/03/2022    BASO 0.04 11/03/2022     Lab Results   Component Value Date     11/03/2022    K 3.4 (L) 11/03/2022     11/03/2022    CO2 29 11/03/2022    GLU 89 11/03/2022    BUN 18 11/03/2022    CREATININE 1.14 (H) 11/03/2022    CALCIUM 9.5 11/03/2022    PROT 8.0 11/03/2022    ALBUMIN 4.2 11/03/2022    BILITOT 0.3 11/03/2022    ALKPHOS 80 11/03/2022    AST 21 11/03/2022    ALT 33 11/03/2022         Imaging: No results found.      Assessment:  Lydia Head is a 67 y.o. female here with:  1. Dysphagia, unspecified type    2. " Constipation, unspecified constipation type    3. Fatty liver          Recommendations:  1. Schedule EGD with dilation  2. Abdominal ultrasound  3. CBC and CMP  4. Pepcid 20 mg twice daily  5. Avoid spicy, greasy foods  Avoid caffeine, citric acid, chocolate, peppermint, and carbonated drinks  Do not lay down within 3 hours of eating  Exercise 150 minutes per week  Increase fluid to 64 ounces daily  Avoid antiinflammatory medications such as motrin, advil, aleve, ibuprofen, and BC powder  6. Miralax powder 17 grams in 8 ounces of liquid  7. Exercise 150 minutes per week  Weight loss of 7-10%. Weight loss should be gradual  Diet low in saturated fats and carbohydrates  Good glucose and cholesterol control      Follow up in about 3 months (around 9/23/2023).      Order summary:  Orders Placed This Encounter    US Abdomen Complete    Comprehensive Metabolic Panel    CBC Auto Differential    famotidine (PEPCID) 20 MG tablet    EGD w Dilation       Thank you for allowing me to participate in the care of Lydia Head.      Colleen Santana, DOMC

## 2023-06-23 NOTE — PATIENT INSTRUCTIONS
Avoid spicy, greasy foods  Avoid caffeine, citric acid, chocolate, peppermint, and carbonated drinks  Do not lay down within 3 hours of eating  Exercise 150 minutes per week  Increase fluid to 64 ounces daily  Avoid antiinflammatory medications such as motrin, advil, aleve, ibuprofen, and BC powder      Miralax 17 grams daily in 8 ounces of liquid

## 2023-06-23 NOTE — TELEPHONE ENCOUNTER
Results and recommendations called to patient. Verbalized understanding.              ----- Message from ANGIE Randhawa sent at 6/23/2023 10:35 AM CDT -----  Potassium is just a little low but stable compared to previous levels.  Advised foods high in potassium such as potatoes and fruit.  Creatinine is minimally elevated.  Increase water intake.  No anemia on CBC.

## 2023-06-30 ENCOUNTER — HOSPITAL ENCOUNTER (OUTPATIENT)
Dept: RADIOLOGY | Facility: HOSPITAL | Age: 68
Discharge: HOME | End: 2023-06-30
Attending: NURSE PRACTITIONER
Payer: MEDICARE

## 2023-06-30 DIAGNOSIS — K76.0 FATTY LIVER: ICD-10-CM

## 2023-06-30 PROCEDURE — 76700 US EXAM ABDOM COMPLETE: CPT | Mod: TC

## 2023-06-30 PROCEDURE — 76700 US ABDOMEN COMPLETE: ICD-10-PCS | Mod: 26,,, | Performed by: STUDENT IN AN ORGANIZED HEALTH CARE EDUCATION/TRAINING PROGRAM

## 2023-06-30 PROCEDURE — 76700 US EXAM ABDOM COMPLETE: CPT | Mod: 26,,, | Performed by: STUDENT IN AN ORGANIZED HEALTH CARE EDUCATION/TRAINING PROGRAM

## 2023-07-03 ENCOUNTER — TELEPHONE (OUTPATIENT)
Dept: GASTROENTEROLOGY | Facility: CLINIC | Age: 68
End: 2023-07-03
Payer: MEDICARE

## 2023-07-03 NOTE — TELEPHONE ENCOUNTER
Results and recommendations called to patient. Verbalized understanding.            ----- Message from ANGIE Randhawa sent at 7/3/2023  7:53 AM CDT -----  Moderate hepatic steatosis.Exercise 150 minutes per week  Weight loss of 7-10%. Weight loss should be gradual  Diet low in saturated fats and carbohydrates  Good glucose and cholesterol control    Simple renal cyst

## 2023-08-22 DIAGNOSIS — R13.10 DYSPHAGIA, UNSPECIFIED TYPE: ICD-10-CM

## 2023-08-22 RX ORDER — FAMOTIDINE 20 MG/1
20 TABLET, FILM COATED ORAL 2 TIMES DAILY
Qty: 60 TABLET | Refills: 0 | Status: SHIPPED | OUTPATIENT
Start: 2023-08-22 | End: 2023-09-21 | Stop reason: SDUPTHER

## 2023-09-19 ENCOUNTER — ANESTHESIA (OUTPATIENT)
Dept: GASTROENTEROLOGY | Facility: HOSPITAL | Age: 68
End: 2023-09-19
Payer: MEDICARE

## 2023-09-19 ENCOUNTER — ANESTHESIA EVENT (OUTPATIENT)
Dept: GASTROENTEROLOGY | Facility: HOSPITAL | Age: 68
End: 2023-09-19
Payer: MEDICARE

## 2023-09-19 ENCOUNTER — HOSPITAL ENCOUNTER (OUTPATIENT)
Dept: GASTROENTEROLOGY | Facility: HOSPITAL | Age: 68
Discharge: HOME | End: 2023-09-19
Attending: NURSE PRACTITIONER
Payer: MEDICARE

## 2023-09-19 VITALS
RESPIRATION RATE: 18 BRPM | DIASTOLIC BLOOD PRESSURE: 59 MMHG | HEART RATE: 77 BPM | SYSTOLIC BLOOD PRESSURE: 113 MMHG | TEMPERATURE: 98 F | OXYGEN SATURATION: 97 %

## 2023-09-19 DIAGNOSIS — R13.19 ESOPHAGEAL DYSPHAGIA: Primary | ICD-10-CM

## 2023-09-19 DIAGNOSIS — R13.10 DYSPHAGIA, UNSPECIFIED TYPE: ICD-10-CM

## 2023-09-19 DIAGNOSIS — K44.9 HH (HIATUS HERNIA): ICD-10-CM

## 2023-09-19 DIAGNOSIS — K22.2 ESOPHAGEAL STRICTURE: ICD-10-CM

## 2023-09-19 DIAGNOSIS — K29.00 ACUTE SUPERFICIAL GASTRITIS WITHOUT HEMORRHAGE: ICD-10-CM

## 2023-09-19 PROCEDURE — D9220A PRA ANESTHESIA: Mod: ,,, | Performed by: NURSE ANESTHETIST, CERTIFIED REGISTERED

## 2023-09-19 PROCEDURE — 25000003 PHARM REV CODE 250: Performed by: NURSE ANESTHETIST, CERTIFIED REGISTERED

## 2023-09-19 PROCEDURE — 88305 TISSUE EXAM BY PATHOLOGIST: CPT | Mod: 26,,, | Performed by: PATHOLOGY

## 2023-09-19 PROCEDURE — 88342 IMHCHEM/IMCYTCHM 1ST ANTB: CPT | Mod: 26,,, | Performed by: PATHOLOGY

## 2023-09-19 PROCEDURE — 43450 PR DILATE ESOPHAGUS: ICD-10-PCS | Mod: 51,,, | Performed by: INTERNAL MEDICINE

## 2023-09-19 PROCEDURE — 43239 PR EGD, FLEX, W/BIOPSY, SGL/MULTI: ICD-10-PCS | Mod: ,,, | Performed by: INTERNAL MEDICINE

## 2023-09-19 PROCEDURE — 27201423 OPTIME MED/SURG SUP & DEVICES STERILE SUPPLY

## 2023-09-19 PROCEDURE — D9220A PRA ANESTHESIA: ICD-10-PCS | Mod: ,,, | Performed by: NURSE ANESTHETIST, CERTIFIED REGISTERED

## 2023-09-19 PROCEDURE — 43239 EGD BIOPSY SINGLE/MULTIPLE: CPT | Performed by: INTERNAL MEDICINE

## 2023-09-19 PROCEDURE — 43450 DILATE ESOPHAGUS 1/MULT PASS: CPT | Performed by: INTERNAL MEDICINE

## 2023-09-19 PROCEDURE — 88305 SURGICAL PATHOLOGY: ICD-10-PCS | Mod: 26,,, | Performed by: PATHOLOGY

## 2023-09-19 PROCEDURE — 43450 DILATE ESOPHAGUS 1/MULT PASS: CPT | Mod: 51,,, | Performed by: INTERNAL MEDICINE

## 2023-09-19 PROCEDURE — 27000284 HC CANNULA NASAL: Performed by: NURSE ANESTHETIST, CERTIFIED REGISTERED

## 2023-09-19 PROCEDURE — 88305 TISSUE EXAM BY PATHOLOGIST: CPT | Mod: TC,SUR | Performed by: INTERNAL MEDICINE

## 2023-09-19 PROCEDURE — 43239 EGD BIOPSY SINGLE/MULTIPLE: CPT | Mod: ,,, | Performed by: INTERNAL MEDICINE

## 2023-09-19 PROCEDURE — 88342 SURGICAL PATHOLOGY: ICD-10-PCS | Mod: 26,,, | Performed by: PATHOLOGY

## 2023-09-19 PROCEDURE — 37000008 HC ANESTHESIA 1ST 15 MINUTES

## 2023-09-19 RX ORDER — LIDOCAINE HYDROCHLORIDE 20 MG/ML
INJECTION, SOLUTION EPIDURAL; INFILTRATION; INTRACAUDAL; PERINEURAL
Status: DISCONTINUED | OUTPATIENT
Start: 2023-09-19 | End: 2023-09-19

## 2023-09-19 RX ORDER — PROPOFOL 10 MG/ML
INJECTION, EMULSION INTRAVENOUS
Status: DISCONTINUED | OUTPATIENT
Start: 2023-09-19 | End: 2023-09-19

## 2023-09-19 RX ORDER — SODIUM CHLORIDE 0.9 % (FLUSH) 0.9 %
10 SYRINGE (ML) INJECTION
Status: DISCONTINUED | OUTPATIENT
Start: 2023-09-19 | End: 2023-09-20 | Stop reason: HOSPADM

## 2023-09-19 RX ADMIN — LIDOCAINE HYDROCHLORIDE 60 MG: 20 INJECTION, SOLUTION INTRAVENOUS at 12:09

## 2023-09-19 RX ADMIN — SODIUM CHLORIDE: 9 INJECTION, SOLUTION INTRAVENOUS at 12:09

## 2023-09-19 RX ADMIN — PROPOFOL 50 MG: 10 INJECTION, EMULSION INTRAVENOUS at 12:09

## 2023-09-19 RX ADMIN — PROPOFOL 100 MG: 10 INJECTION, EMULSION INTRAVENOUS at 12:09

## 2023-09-19 NOTE — H&P
[facility] - Endoscopy  Gastroenterology  H&P    Patient Name: Lydia Head  MRN: [id number]  Admission Date: 2023  Code Status: Full Code    Attending Provider: Colleen Santana FNP   Primary Care Physician: Jose Antoine MD  Principal Problem:<principal problem not specified>    Subjective:     History of Present Illness: Pt c/o esophageal dysphagia; for egd with dilation of esophagus.    Past Medical History:   Diagnosis Date    Acquired cyst of kidney     Coronary arteriosclerosis     Encounter for long-term (current) use of other medications     Fatigue     Hx of screening mammography 2018    Hx of screening mammography 2021    [facility]    Hyperlipidemia     Hypertension     Multiple joint pain     Nutritional anemia     Vitamin D deficiency        Past Surgical History:   Procedure Laterality Date     SECTION      HYSTERECTOMY      ROTATOR CUFF REPAIR Right     procedure done twice per Dr. Jim Cornelius.    TOTAL REPLACEMENT OF BOTH HIP JOINTS USING COMPUTER-ASSISTED NAVIGATION Right     Performed by Dr. Jim Cornelius.    VAGINAL DELIVERY      x 2       Review of patient's allergies indicates:   Allergen Reactions    Codeine Nausea And Vomiting     Family History       Problem Relation (Age of Onset)    Alzheimer's disease Father    Asthma Sister    Breast cancer Mother, Sister, Sister    COPD Brother, Brother    Diabetes Mother, Father    Lung cancer Mother, Brother    No Known Problems Sister, Daughter, Son, Son, Brother, Sister          Tobacco Use    Smoking status: Never    Smokeless tobacco: Never   Substance and Sexual Activity    Alcohol use: Never    Drug use: Never    Sexual activity: Not Currently     Partners: Male     Birth control/protection: See Surgical Hx     Comment: hysterectomy     Review of Systems   HENT:  Positive for trouble swallowing.    Respiratory: Negative.     Cardiovascular: Negative.    Gastrointestinal: Negative.      Objective:     Vital Signs (Most  "Recent):  Pulse: 63 (09/19/23 1143)  Resp: 17 (09/19/23 1143)  BP: 127/64 (09/19/23 1143)  SpO2: 98 % (09/19/23 1143) Vital Signs (24h Range):  Pulse:  [63] 63  Resp:  [17] 17  SpO2:  [98 %] 98 %  BP: (127)/(64) 127/64        There is no height or weight on file to calculate BMI.    No intake or output data in the 24 hours ending 09/19/23 1226    Lines/Drains/Airways       Peripheral Intravenous Line  Duration                  Peripheral IV - Single Lumen 09/19/23 1142 22 G Left Upper Arm <1 day                    Physical Exam  Vitals reviewed.   Constitutional:       General: She is not in acute distress.     Appearance: Normal appearance. She is well-developed. She is not ill-appearing.   HENT:      Head: Normocephalic and atraumatic.      Nose: Nose normal.   Eyes:      Pupils: Pupils are equal, round, and reactive to light.   Cardiovascular:      Rate and Rhythm: Normal rate and regular rhythm.   Pulmonary:      Effort: Pulmonary effort is normal.      Breath sounds: Normal breath sounds. No wheezing.   Abdominal:      General: Abdomen is flat. Bowel sounds are normal. There is no distension.      Palpations: Abdomen is soft.      Tenderness: There is no abdominal tenderness. There is no guarding.   Skin:     General: Skin is warm and dry.      Coloration: Skin is not jaundiced.   Neurological:      Mental Status: She is alert.   Psychiatric:         Attention and Perception: Attention normal.         Mood and Affect: Affect normal.         Speech: Speech normal.         Behavior: Behavior is cooperative.      Comments: Pt was calm while speaking.         Significant Labs:  CBC: No results for input(s): "WBC", "HGB", "HCT", "PLT" in the last 48 hours.  CMP: No results for input(s): "GLU", "CALCIUM", "ALBUMIN", "PROT", "NA", "K", "CO2", "CL", "BUN", "CREATININE", "ALKPHOS", "ALT", "AST", "BILITOT" in the last 48 hours.    Significant Imaging:  Imaging results within the past 24 hours have been " reviewed.    Assessment/Plan:     There are no hospital problems to display for this patient.        Imp: esophageal dysphagia  Plan: egd with dilation of the esophagus.    Mikel Gill MD  Gastroenterology  [facility] - Endoscopy

## 2023-09-19 NOTE — TRANSFER OF CARE
Anesthesia Transfer of Care Note    Patient: Lydia Head    Procedure(s) Performed: EGD with Esophageal Dilation    Patient location: GI    Anesthesia Type: general    Transport from OR: Transported from OR on room air with adequate spontaneous ventilation. Continuous ECG monitoring in transport. Continuous SpO2 monitoring in transport    Post pain: adequate analgesia    Post assessment: no apparent anesthetic complications and tolerated procedure well    Post vital signs: stable    Level of consciousness: responds to stimulation    Nausea/Vomiting: no nausea/vomiting    Complications: none    Transfer of care protocol was followed      Last vitals:   Visit Vitals  /73 (BP Location: Right arm, Patient Position: Lying)   Pulse 85   Temp 36.6 °C (97.8 °F) (Skin)   Resp 20   SpO2 97%   Breastfeeding No

## 2023-09-19 NOTE — ANESTHESIA POSTPROCEDURE EVALUATION
Anesthesia Post Evaluation    Patient: Lydia Head    Procedure(s) Performed: EGD with Esophageal Dilation    Final Anesthesia Type: general      Patient location during evaluation: GI PACU  Patient participation: Yes- Able to Participate  Level of consciousness: awake and alert  Post-procedure vital signs: reviewed and stable  Pain management: adequate  Airway patency: patent  VAHID mitigation strategies: Multimodal analgesia  PONV status at discharge: No PONV  Anesthetic complications: no      Cardiovascular status: stable  Respiratory status: unassisted  Hydration status: euvolemic  Follow-up not needed.          Vitals Value Taken Time   /66 09/19/23 1302   Temp  09/19/23 1337   Pulse 70 09/19/23 1303   Resp 14 09/19/23 1301   SpO2 98 % 09/19/23 1303   Vitals shown include unvalidated device data.      Event Time   Out of Recovery 13:06:39         Pain/Cecy Score: Cecy Score: 10 (9/19/2023 12:48 PM)

## 2023-09-19 NOTE — DISCHARGE INSTRUCTIONS
Procedure Date  9/19/23     Impression  Overall Impression:   Performed forceps biopsies in the esophagus and stomach  Dilation in the esophagus  5 cm hiatal hernia  Dilated in the esophagus with Marcus dilator  Erythematous mucosa in the fundus of the stomach and antrum; performed cold forceps biopsy     A mild stricture was noted in the distal esophagus, overlying a 5cm hiatus hernia (35cm-40cm). The distal esophagus was biopsied and the esophagus was dilated with a 48F Marcus dilator. The stomach has gastritis without ulcers and biopsies were obtained. The duodenum has normal mucosa.  Recommendation    Await pathology results      Continue daily PPI or H2RA, repeat esophageal dilation prn.  Discharge: disp: DC to home, resume diet, no driving x 24 hours, f/u with PCP as scheduled.  Dx: esophageal dysphagia, esophageal stricture, hiatus hernia, gastritis, s/p dilation of the esophagus

## 2023-09-19 NOTE — ANESTHESIA PREPROCEDURE EVALUATION
2023  Lydia Head is a 68 y.o., female.      Pre-op Assessment    I have reviewed the Patient Summary Reports.     I have reviewed the Nursing Notes. I have reviewed the NPO Status.   I have reviewed the Medications.     Review of Systems  Anesthesia Hx:  No problems with previous Anesthesia    Cardiovascular:   Hypertension CAD      Renal/:   Chronic Renal Disease    Hepatic/GI:   Liver Disease,    Neurological:   Neuromuscular Disease,      Coronary arteriosclerosis Hypertension   Fatigue Hyperlipidemia   Multiple joint pain Acquired cyst of kidney   Nutritional anemia Encounter for long-term (current) use of other medications   Vitamin D deficiency Hx of screening mammography   Hx of screening mammography      Surgical History     SECTION HYSTERECTOMY   ROTATOR CUFF REPAIR TOTAL REPLACEMENT OF BOTH HIP JOINTS USING COMPUTER-ASSISTED NAVIGATION   VAGINAL DELIVERY          Physical Exam  General: Well nourished, Alert and Oriented    Airway:  Mallampati: II   Mouth Opening: Normal  TM Distance: Normal  Tongue: Normal  Neck ROM: Normal ROM    Dental:  Intact        Anesthesia Plan  Type of Anesthesia, risks & benefits discussed:    Anesthesia Type: Gen Natural Airway  Intra-op Monitoring Plan: Standard ASA Monitors  Post Op Pain Control Plan: multimodal analgesia  Induction:  IV  Informed Consent: Informed consent signed with the Patient and all parties understand the risks and agree with anesthesia plan.  All questions answered. Patient consented to blood products? Yes  ASA Score: 3  Day of Surgery Review of History & Physical: H&P Update referred to the surgeon/provider.    Ready For Surgery From Anesthesia Perspective.     .

## 2023-09-20 LAB
ESTROGEN SERPL-MCNC: NORMAL PG/ML
INSULIN SERPL-ACNC: NORMAL U[IU]/ML
LAB AP GROSS DESCRIPTION: NORMAL
LAB AP LABORATORY NOTES: NORMAL
T3RU NFR SERPL: NORMAL %

## 2023-09-21 ENCOUNTER — TELEPHONE (OUTPATIENT)
Dept: GASTROENTEROLOGY | Facility: CLINIC | Age: 68
End: 2023-09-21
Payer: MEDICARE

## 2023-09-21 DIAGNOSIS — R13.10 DYSPHAGIA, UNSPECIFIED TYPE: ICD-10-CM

## 2023-09-21 RX ORDER — FAMOTIDINE 20 MG/1
20 TABLET, FILM COATED ORAL 2 TIMES DAILY
Qty: 60 TABLET | Refills: 0 | Status: SHIPPED | OUTPATIENT
Start: 2023-09-21 | End: 2023-10-13

## 2023-09-21 NOTE — TELEPHONE ENCOUNTER
Results and recommendations called to patient. Verbalized understanding.              ----- Message from Mikel Gill MD sent at 9/20/2023  5:07 PM CDT -----  EGD biopsies show gastritis and reflux esophagitis without H.pylori.  Recommend: avoid nsaids; continue acid reducer as needed.

## 2023-10-06 ENCOUNTER — OFFICE VISIT (OUTPATIENT)
Dept: GASTROENTEROLOGY | Facility: CLINIC | Age: 68
End: 2023-10-06
Payer: MEDICARE

## 2023-10-06 VITALS
HEART RATE: 68 BPM | BODY MASS INDEX: 34.42 KG/M2 | DIASTOLIC BLOOD PRESSURE: 60 MMHG | HEIGHT: 64 IN | WEIGHT: 201.63 LBS | SYSTOLIC BLOOD PRESSURE: 127 MMHG

## 2023-10-06 DIAGNOSIS — K22.2 ESOPHAGEAL STRICTURE: Primary | ICD-10-CM

## 2023-10-06 DIAGNOSIS — K29.00 ACUTE SUPERFICIAL GASTRITIS WITHOUT HEMORRHAGE: ICD-10-CM

## 2023-10-06 DIAGNOSIS — K76.0 FATTY LIVER: ICD-10-CM

## 2023-10-06 DIAGNOSIS — K44.9 HH (HIATUS HERNIA): ICD-10-CM

## 2023-10-06 PROCEDURE — 99214 PR OFFICE/OUTPT VISIT, EST, LEVL IV, 30-39 MIN: ICD-10-PCS | Mod: S$PBB,,, | Performed by: NURSE PRACTITIONER

## 2023-10-06 PROCEDURE — 99214 OFFICE O/P EST MOD 30 MIN: CPT | Mod: S$PBB,,, | Performed by: NURSE PRACTITIONER

## 2023-10-06 PROCEDURE — 99214 OFFICE O/P EST MOD 30 MIN: CPT | Mod: PBBFAC | Performed by: NURSE PRACTITIONER

## 2023-10-06 RX ORDER — PRAVASTATIN SODIUM 10 MG/1
10 TABLET ORAL NIGHTLY
COMMUNITY
Start: 2023-08-02

## 2023-10-06 NOTE — PROGRESS NOTES
Lydia Head is a 68 y.o. female here for Follow-up        PCP: Jose Antoine  Referring Provider: No referring provider defined for this encounter.     HPI:  Presents for follow-up after EGD dilation.  Reports that she is doing much better.  States that dysphagia has improved.  Also reports that Pepcid has improved reflux symptoms.  EGD dilation on , report reviewed, 5 cm hiatal hernia, esophageal stricture.  Negative for H pylori, positive for reflux.  Reports that constipation is much improved taking MiraLax powder.  Last colonoscopy was 2019, recommendation to repeat in 10 years.  She did have an abdominal ultrasound on 2023 that does show moderate hepatic steatosis.  We did discuss weight loss of 7-10% as well as exercise 150 minutes per week.  States that she does exercise regularly.    Follow-up  Pertinent negatives include no abdominal pain, change in bowel habit, chest pain, fatigue, fever, nausea or vomiting.         ROS:  Review of Systems   Constitutional:  Negative for appetite change, fatigue, fever and unexpected weight change.   HENT:  Negative for trouble swallowing.    Cardiovascular:  Negative for chest pain.   Gastrointestinal:  Positive for constipation and reflux. Negative for abdominal pain, blood in stool, change in bowel habit, diarrhea, nausea and vomiting.   Musculoskeletal:  Negative for gait problem.   Integumentary:  Negative for pallor.   Psychiatric/Behavioral:  The patient is not nervous/anxious.           PMHX:  has a past medical history of Acquired cyst of kidney, Coronary arteriosclerosis, Encounter for long-term (current) use of other medications, Fatigue, screening mammography (2018), screening mammography (2021), Hyperlipidemia, Hypertension, Multiple joint pain, Nutritional anemia, and Vitamin D deficiency.    PSHX:  has a past surgical history that includes  section (); Hysterectomy; Rotator cuff repair (Right); Total  replacement of both hip joints using computer-assisted navigation (Right); and Vaginal delivery.    PFHX: family history includes Alzheimer's disease in her father; Asthma in her sister; Breast cancer in her mother, sister, and sister; COPD in her brother and brother; Diabetes in her father and mother; Lung cancer in her brother and mother; No Known Problems in her brother, daughter, sister, sister, son, and son.    PSlHX:  reports that she has never smoked. She has never used smokeless tobacco. She reports that she does not drink alcohol and does not use drugs.        Review of patient's allergies indicates:   Allergen Reactions    Codeine Nausea And Vomiting       Medication List with Changes/Refills   Current Medications    ASPIRIN (ECOTRIN) 81 MG EC TABLET    Take 81 mg by mouth once daily.    CALCIUM CITRATE-VITAMIN D3 315-200 MG (CITRACAL+D) 315 MG-5 MCG (200 UNIT) PER TABLET    Take 2 tablets by mouth once daily.    CHLORTHALIDONE (HYGROTEN) 25 MG TAB    1/2 tab by mouth daily    CHOLECALCIFEROL, VITAMIN D3, (VITAMIN D3) 50 MCG (2,000 UNIT) CAP    Take 1 capsule by mouth once daily.    DICLOFENAC SODIUM (VOLTAREN) 1 % GEL    Apply 4 g topically 4 (four) times daily.    FAMOTIDINE (PEPCID) 20 MG TABLET    Take 1 tablet (20 mg total) by mouth 2 (two) times daily.    HYDROXYZINE (ATARAX) 50 MG TABLET    Take 1 tablet (50 mg total) by mouth nightly as needed (insomnia).    IBUPROFEN (ADVIL,MOTRIN) 600 MG TABLET    Take 1 tablet (600 mg total) by mouth every 6 (six) hours as needed for Pain.    METOPROLOL SUCCINATE (TOPROL-XL) 25 MG 24 HR TABLET    Take 1 tablet by mouth once daily.    POTASSIUM CHLORIDE SA (K-DUR,KLOR-CON) 20 MEQ TABLET    Take 1 tablet by mouth 2 (two) times a day.    PRAVASTATIN (PRAVACHOL) 10 MG TABLET    Take 10 mg by mouth every evening.    ROSUVASTATIN (CRESTOR) 10 MG TABLET    Take 1 tablet (10 mg total) by mouth once daily.        Objective Findings:  Vital Signs:  /60   Pulse 68   " Ht 5' 4" (1.626 m)   Wt 91.4 kg (201 lb 9.6 oz)   BMI 34.60 kg/m²  Body mass index is 34.6 kg/m².    Physical Exam:  Physical Exam  Vitals and nursing note reviewed.   Constitutional:       General: She is not in acute distress.     Appearance: Normal appearance. She is obese.   HENT:      Mouth/Throat:      Mouth: Mucous membranes are moist.   Cardiovascular:      Rate and Rhythm: Normal rate.   Pulmonary:      Effort: Pulmonary effort is normal.      Breath sounds: No wheezing, rhonchi or rales.   Abdominal:      General: Bowel sounds are normal. There is no distension.      Palpations: Abdomen is soft. There is no mass.      Tenderness: There is no abdominal tenderness. There is no guarding.   Skin:     General: Skin is warm and dry.      Coloration: Skin is not jaundiced or pale.   Neurological:      Mental Status: She is alert and oriented to person, place, and time.   Psychiatric:         Mood and Affect: Mood normal.          Labs:  Lab Results   Component Value Date    WBC 3.52 (L) 06/23/2023    HGB 13.1 06/23/2023    HCT 40.3 06/23/2023    MCV 80.8 06/23/2023    RDW 14.3 06/23/2023     06/23/2023    LYMPH 49.1 (H) 06/23/2023    LYMPH 1.73 06/23/2023    MONO 12.8 (H) 06/23/2023    EOS 0.06 06/23/2023    BASO 0.04 06/23/2023     Lab Results   Component Value Date     06/23/2023    K 3.4 (L) 06/23/2023     (H) 06/23/2023    CO2 29 06/23/2023    GLU 98 06/23/2023    BUN 16 06/23/2023    CREATININE 1.13 (H) 06/23/2023    CALCIUM 10.0 06/23/2023    PROT 7.5 06/23/2023    ALBUMIN 3.8 06/23/2023    BILITOT 0.3 06/23/2023    ALKPHOS 68 06/23/2023    AST 18 06/23/2023    ALT 26 06/23/2023         Imaging: EGD w Dilation    Addendum Date: 9/19/2023    Procedure Date 9/19/23 Impression Overall Impression: Performed forceps biopsies in the esophagus and stomach Dilation in the esophagus 5 cm hiatal hernia Dilated in the esophagus with Marcus dilator Erythematous mucosa in the fundus of the " stomach and antrum; performed cold forceps biopsy A mild stricture was noted in the distal esophagus, overlying a 5cm hiatus hernia (35cm-40cm). The distal esophagus was biopsied and the esophagus was dilated with a 48F Marcus dilator. The stomach has gastritis without ulcers and biopsies were obtained. The duodenum has normal mucosa. Recommendation  Await pathology results Continue daily PPI or H2RA, repeat esophageal dilation prn. Discharge: disp: DC to home, resume diet, no driving x 24 hours, f/u with PCP as scheduled. Dx: esophageal dysphagia, esophageal stricture, hiatus hernia, gastritis, s/p dilation of the esophagus Indication Dysphagia, unspecified type Providers Jose Mathew Jr., CRNA CRNA [place], Amber N Technician Linda Myles RN Registered Nurse Mikel Gill MD Proceduralist Vu Garcia CRNA CRNA Medications Moderate sedation administered by anesthesia staff - See anesthesia record. Preprocedure A history and physical has been performed, and patient medication allergies have been reviewed. The patient's tolerance of previous anesthesia has been reviewed. The risks and benefits of the procedure and the sedation options and risks were discussed with the patient. All questions were answered and informed consent obtained. ASA Score: ASA 2 - Patient with mild systemic disease with no functional limitations Mallampati Airway Score: II (hard and soft palate, upper portion of tonsils anduvula visible) Details of the Procedure The patient underwent monitored anesthesia care, which was administered by an anesthesia professional. The patient's blood pressure, heart rate, level of consciousness, oxygen, respirations, ECG and ETCO2 were monitored throughout the procedure. The scope was introduced through the mouth and advanced to the third part of the duodenum. Retroflexion was performed in the cardia. Prior to the procedure, the patient's H. Pylori status was unknown. The patient's  estimated blood loss was minimal (<5 mL). The procedure was not difficult. The patient tolerated the procedure well. There were no apparent adverse events. Scope: Gastroscope Scope Serial: [id number] Events Procedure Events Event Event Time Procedure Events Event Event Time SCOPE IN 9/19/2023 12:32 PM SCOPE OUT 9/19/2023 12:35 PM Findings Performed multiple forceps biopsies in the esophagus and stomach Dilation in the esophagus. Dilated with 48Fr marcus 5 cm hiatal hernia Dilated in the esophagus with Marcus dilator Erythematous mucosa in the fundus of the stomach and antrum; performed cold forceps biopsy     Result Date: 9/19/2023  Table formatting from the original result was not included. Procedure Date 9/19/23 Impression Overall      Performed forceps biopsies in the esophagus and stomach Dilation in the esophagus 5 cm hiatal hernia Dilated in the esophagus with Marcus dilator Erythematous mucosa in the fundus of the stomach and antrum; performed cold forceps biopsy A mild stricture was noted in the distal esophagus, overlying a 5cm hiatus hernia (35cm-40cm). The distal esophagus was biopsied and the esophagus was dilated with a 48F Marcus dilator. The stomach has gastritis without ulcers and biopsies were obtained. The duodenum has normal mucosa. Recommendation  Await pathology results Continue daily PPI, repeat esophageal dilation prn. Discharge: disp: DC to home, resume diet, no driving x 24 hours, f/u with PCP as scheduled. Dx: esophageal dysphagia, esophageal stricture, hiatus hernia, gastritis, s/p dilation of the esophagus Indication Dysphagia, unspecified type Providers Jose Mathew Jr., Maureen Stahl CRNA Technician Linda Myles RN Registered Nurse Mikel Gill MD Proceduralist Vu Garcia CRNA CRNA Medications Moderate sedation administered by anesthesia staff - See anesthesia record. Preprocedure A history and physical has been performed, and patient medication  allergies have been reviewed. The patient's tolerance of previous anesthesia has been reviewed. The risks and benefits of the procedure and the sedation options and risks were discussed with the patient. All questions were answered and informed consent obtained. ASA Score: ASA 2 - Patient with mild systemic disease with no functional limitations Mallampati Airway Score: II (hard and soft palate, upper portion of tonsils anduvula visible) Details of the Procedure The patient underwent monitored anesthesia care, which was administered by an anesthesia professional. The patient's blood pressure, heart rate, level of consciousness, oxygen, respirations, ECG and ETCO2 were monitored throughout the procedure. The scope was introduced through the mouth and advanced to the third part of the duodenum. Retroflexion was performed in the cardia. Prior to the procedure, the patient's H. Pylori status was unknown. The patient's estimated blood loss was minimal (<5 mL). The procedure was not difficult. The patient tolerated the procedure well. There were no apparent adverse events. Scope: Gastroscope Scope Serial: [id number] Events Procedure Events Event Event Time Procedure Events Event Event Time SCOPE IN 9/19/2023 12:32 PM SCOPE OUT 9/19/2023 12:35 PM Findings Performed multiple forceps biopsies in the esophagus and stomach Dilation in the esophagus. Dilated with 48Fr marcus 5 cm hiatal hernia Dilated in the esophagus with Marcus dilator Erythematous mucosa in the fundus of the stomach and antrum; performed cold forceps biopsy         Assessment:  Lydia Head is a 68 y.o. female here with:  1. Esophageal stricture    2. HH (hiatus hernia)    3. Acute superficial gastritis without hemorrhage    4. Fatty liver          Recommendations:  1. Continue Pepcid  2. Avoid spicy, greasy foods  Avoid caffeine, citric acid, chocolate, peppermint, and carbonated drinks  Do not lay down within 3 hours of eating  Exercise 150 minutes per  week  Increase fluid to 64 ounces daily  Avoid antiinflammatory medications such as motrin, advil, aleve, ibuprofen, and BC powder  3. Weight loss of 7-10%. Weight loss should be gradual  Diet low in saturated fats and carbohydrates  Good glucose and cholesterol control      Follow up in about 6 months (around 4/6/2024).      Order summary:  Orders Placed This Encounter    US Abdomen Limited       Thank you for allowing me to participate in the care of Lydia Head.      DOM NaikC

## 2023-10-06 NOTE — PATIENT INSTRUCTIONS
Exercise 150 minutes per week  Weight loss of 7-10%. Weight loss should be gradual  Diet low in saturated fats and carbohydrates  Good glucose and cholesterol control      Increase fiber to 35 grams daily  Drink 64 ounces of water daily  Exercise 150 minutes per week  Miralax powder 1 capful 17 grams in 8 ounces of liquid daily

## 2023-10-09 DIAGNOSIS — Z71.89 COMPLEX CARE COORDINATION: ICD-10-CM

## 2023-10-13 DIAGNOSIS — R13.10 DYSPHAGIA, UNSPECIFIED TYPE: ICD-10-CM

## 2023-10-13 RX ORDER — FAMOTIDINE 20 MG/1
20 TABLET, FILM COATED ORAL 2 TIMES DAILY
Qty: 60 TABLET | Refills: 0 | Status: SHIPPED | OUTPATIENT
Start: 2023-10-13 | End: 2023-11-13

## 2023-10-17 ENCOUNTER — TELEPHONE (OUTPATIENT)
Dept: GASTROENTEROLOGY | Facility: CLINIC | Age: 68
End: 2023-10-17
Payer: MEDICARE

## 2023-10-17 ENCOUNTER — HOSPITAL ENCOUNTER (OUTPATIENT)
Dept: RADIOLOGY | Facility: HOSPITAL | Age: 68
Discharge: HOME | End: 2023-10-17
Attending: NURSE PRACTITIONER
Payer: MEDICARE

## 2023-10-17 DIAGNOSIS — K76.0 FATTY LIVER: ICD-10-CM

## 2023-10-17 PROCEDURE — 76705 US ABDOMEN LIMITED: ICD-10-PCS | Mod: 26,,, | Performed by: RADIOLOGY

## 2023-10-17 PROCEDURE — 76705 ECHO EXAM OF ABDOMEN: CPT | Mod: 26,,, | Performed by: RADIOLOGY

## 2023-10-17 PROCEDURE — 76705 ECHO EXAM OF ABDOMEN: CPT | Mod: TC

## 2023-10-17 NOTE — TELEPHONE ENCOUNTER
Results and recommendations called to patient. Verbalized understanding.          ----- Message from ANGIE Randhawa sent at 10/17/2023  9:30 AM CDT -----  Fatty liver. Simple cyst. Exercise 150 minutes per week  Weight loss of 7-10%. Weight loss should be gradual  Diet low in saturated fats and carbohydrates  Good glucose and cholesterol control  Simple renal cyst

## 2023-11-13 DIAGNOSIS — R13.10 DYSPHAGIA, UNSPECIFIED TYPE: ICD-10-CM

## 2023-11-13 RX ORDER — FAMOTIDINE 20 MG/1
20 TABLET, FILM COATED ORAL 2 TIMES DAILY
Qty: 60 TABLET | Refills: 0 | Status: SHIPPED | OUTPATIENT
Start: 2023-11-13 | End: 2023-12-18

## 2023-11-30 LAB — BCS RECOMMENDATION EXT: NORMAL

## 2023-12-16 DIAGNOSIS — R13.10 DYSPHAGIA, UNSPECIFIED TYPE: ICD-10-CM

## 2023-12-18 RX ORDER — FAMOTIDINE 20 MG/1
20 TABLET, FILM COATED ORAL 2 TIMES DAILY
Qty: 60 TABLET | Refills: 0 | Status: SHIPPED | OUTPATIENT
Start: 2023-12-18 | End: 2024-01-16

## 2024-01-14 DIAGNOSIS — R13.10 DYSPHAGIA, UNSPECIFIED TYPE: ICD-10-CM

## 2024-01-16 RX ORDER — FAMOTIDINE 20 MG/1
20 TABLET, FILM COATED ORAL 2 TIMES DAILY
Qty: 60 TABLET | Refills: 0 | Status: SHIPPED | OUTPATIENT
Start: 2024-01-16 | End: 2024-02-12

## 2024-02-10 DIAGNOSIS — R13.10 DYSPHAGIA, UNSPECIFIED TYPE: ICD-10-CM

## 2024-02-12 RX ORDER — FAMOTIDINE 20 MG/1
20 TABLET, FILM COATED ORAL 2 TIMES DAILY
Qty: 60 TABLET | Refills: 0 | Status: SHIPPED | OUTPATIENT
Start: 2024-02-12 | End: 2024-03-11

## 2024-03-10 DIAGNOSIS — R13.10 DYSPHAGIA, UNSPECIFIED TYPE: ICD-10-CM

## 2024-03-11 RX ORDER — FAMOTIDINE 20 MG/1
20 TABLET, FILM COATED ORAL 2 TIMES DAILY
Qty: 60 TABLET | Refills: 0 | Status: SHIPPED | OUTPATIENT
Start: 2024-03-11 | End: 2024-06-10 | Stop reason: SDUPTHER

## 2024-06-10 ENCOUNTER — OFFICE VISIT (OUTPATIENT)
Dept: GASTROENTEROLOGY | Facility: CLINIC | Age: 69
End: 2024-06-10
Payer: MEDICARE

## 2024-06-10 ENCOUNTER — TELEPHONE (OUTPATIENT)
Dept: GASTROENTEROLOGY | Facility: CLINIC | Age: 69
End: 2024-06-10
Payer: MEDICARE

## 2024-06-10 VITALS
HEIGHT: 64 IN | WEIGHT: 202 LBS | SYSTOLIC BLOOD PRESSURE: 134 MMHG | DIASTOLIC BLOOD PRESSURE: 71 MMHG | HEART RATE: 65 BPM | BODY MASS INDEX: 34.49 KG/M2

## 2024-06-10 DIAGNOSIS — R13.10 DYSPHAGIA, UNSPECIFIED TYPE: ICD-10-CM

## 2024-06-10 DIAGNOSIS — K76.0 FATTY LIVER: Primary | ICD-10-CM

## 2024-06-10 DIAGNOSIS — R68.81 EARLY SATIETY: ICD-10-CM

## 2024-06-10 PROCEDURE — 99215 OFFICE O/P EST HI 40 MIN: CPT | Mod: PBBFAC

## 2024-06-10 PROCEDURE — 99214 OFFICE O/P EST MOD 30 MIN: CPT | Mod: S$PBB,,,

## 2024-06-10 RX ORDER — FAMOTIDINE 20 MG/1
20 TABLET, FILM COATED ORAL 2 TIMES DAILY
Qty: 60 TABLET | Refills: 0 | Status: SHIPPED | OUTPATIENT
Start: 2024-06-10

## 2024-06-10 RX ORDER — ATORVASTATIN CALCIUM 10 MG/1
10 TABLET, FILM COATED ORAL DAILY
COMMUNITY

## 2024-06-10 NOTE — TELEPHONE ENCOUNTER
----- Message from ANGIE Bui sent at 6/10/2024  1:42 PM CDT -----  Labs are acceptable. Liver enzymes within normal range

## 2024-06-10 NOTE — PROGRESS NOTES
Gastroenterology Clinic Note    Patient ID: [id number]   Referring MD: No ref. provider found   Chief Complaint:   Chief Complaint   Patient presents with    Follow-up    Bloated     Happens regularly for 1-2 weeks, stops then returns    Fatty Liver       History of Present Illness   Lydia Head is an 68 y.o. AAF who is referred for follow-up.  Patient previously followed in GI Clinic by ANGIE Barry for fatty liver and history of dysphagia requiring EGD with dilation.  Her only complaint today is intermittent bloating.  She denies any constipation or diarrhea.  She takes MiraLax daily and reports soft bowel movements with this.  She denies nausea.  She is taking Pepcid 20 mg b.i.d. for GERD.  She denies any unintentional weight loss.  She does not associate the bloating with her meals.  She reports the symptoms occur randomly throughout the day.  She does experience early satiety.  She denies any dysphagia at present.    Previous workup:  Ultrasound abdomen limited liver    Last colonoscopy was 2019 with 10 year recall for screening purposes.    Review of Systems   Constitutional:  Negative for weight loss.   Gastrointestinal:  Positive for constipation. Negative for abdominal pain, blood in stool, diarrhea, heartburn, melena, nausea and vomiting.       Past Medical History      Past Medical History:   Diagnosis Date    Acquired cyst of kidney     Coronary arteriosclerosis     Encounter for long-term (current) use of other medications     Fatigue     Hx of screening mammography 2018    Hx of screening mammography 2021    [facility]    Hyperlipidemia     Hypertension     Multiple joint pain     Nutritional anemia     Vitamin D deficiency        Past Surgical History     Past Surgical History:   Procedure Laterality Date     SECTION      HYSTERECTOMY      ROTATOR CUFF REPAIR Right     procedure done twice per Dr. Jim Cornelius.    TOTAL REPLACEMENT OF BOTH HIP JOINTS USING COMPUTER-ASSISTED  NAVIGATION Right     Performed by Dr. Jim Cornelius.    VAGINAL DELIVERY      x 2       Allergies     Review of patient's allergies indicates:   Allergen Reactions    Codeine Nausea And Vomiting       Immunization History     Immunization History   Administered Date(s) Administered    Influenza 09/09/2020    Influenza (FLUAD) - Quadrivalent - Adjuvanted - PF *Preferred* (65+) 11/03/2022    Influenza - Quadrivalent - PF *Preferred* (6 months and older) 11/03/2021    Zoster Recombinant 12/08/2021, 01/14/2022       Past Family History      Family History   Problem Relation Name Age of Onset    Diabetes Mother          age 70    Lung cancer Mother      Breast cancer Mother      Alzheimer's disease Father          age 72    Diabetes Father      No Known Problems Sister          covid age 60    COPD Brother          age 60's    Lung cancer Brother          age 50's    No Known Problems Daughter          hypotension age 40    No Known Problems Son          pre diabetic age 43    No Known Problems Son          age 36    COPD Brother          age 69    No Known Problems Brother          age 66    Asthma Sister          age 75    Breast cancer Sister      Breast cancer Sister          age 72    No Known Problems Sister          age 63       Past Social History      Social History     Socioeconomic History    Marital status:    Tobacco Use    Smoking status: Never    Smokeless tobacco: Never   Substance and Sexual Activity    Alcohol use: Never    Drug use: Never    Sexual activity: Not Currently     Partners: Male     Birth control/protection: See Surgical Hx     Comment: hysterectomy       Current Medications     Outpatient Medications Marked as Taking for the 6/10/24 encounter (Office Visit) with Kajal Alvarez FNP   Medication Sig Dispense Refill    aspirin (ECOTRIN) 81 MG EC tablet Take 81 mg by mouth once daily.      atorvastatin (LIPITOR) 10 MG tablet Take 10 mg by mouth once daily.      calcium citrate-vitamin D3  "315-200 mg (CITRACAL+D) 315 mg-5 mcg (200 unit) per tablet Take 2 tablets by mouth once daily.      chlorthalidone (HYGROTEN) 25 MG Tab 1/2 tab by mouth daily      famotidine (PEPCID) 20 MG tablet Take 1 tablet by mouth twice daily 60 tablet 0    metoprolol succinate (TOPROL-XL) 25 MG 24 hr tablet Take 1 tablet by mouth once daily.      potassium chloride SA (K-DUR,KLOR-CON) 20 MEQ tablet Take 1 tablet by mouth 2 (two) times a day.          I have reviewed the current medications, allergies, vital signs, past medical and surgical history, family medical history, and social history for this encounter and agree with all findings.    OBJECTIVE    Physical Exam    /71   Pulse 65   Ht 5' 4" (1.626 m)   Wt 91.6 kg (202 lb)   BMI 34.67 kg/m²   GEN: Well appearing, cooperative, NAD  NECK: Supple, no LAD  CV: Normal rate  RESP: Unlabored  ABD: ND, NT, soft, no guarding  EXT: No clubbing, cyanosis, or edema  SKIN: Warm and dry  NEURO: AAO x4.     LABS    CBC (with or without Differential):   Lab Results   Component Value Date    WBC 3.52 (L) 06/23/2023    HGB 13.1 06/23/2023    HCT 40.3 06/23/2023    MCV 80.8 06/23/2023    MCH 26.3 (L) 06/23/2023    MCHC 32.5 06/23/2023    RDW 14.3 06/23/2023     06/23/2023    MPV 9.2 (L) 06/23/2023    NEUTOPHILPCT 35.3 (L) 06/23/2023    DIFFTYPE Auto 06/23/2023     BMP/CMP:   Lab Results   Component Value Date     06/23/2023    K 3.4 (L) 06/23/2023     (H) 06/23/2023    CO2 29 06/23/2023    BUN 16 06/23/2023    CREATININE 1.13 (H) 06/23/2023    GLU 98 06/23/2023    CALCIUM 10.0 06/23/2023    ALBUMIN 3.8 06/23/2023    AST 18 06/23/2023    ALT 26 06/23/2023    ALKPHOS 68 06/23/2023    MG 2.5 (H) 11/03/2022        IMAGING  Ultrasound abdomen limited liver 10/2023  - fatty liver infiltration.  Simple appearing right renal cyst.  No other evidence of abnormality demonstrated.    ASSESSMENT  Lydia Head is a 68 y.o. AAF with history hypertension, hyperlipidemia, GERD, " and fatty liver who is referred for follow-up.    1. Fatty liver    2. Dysphagia, unspecified type    3. Early satiety           PLAN    - continue MiraLax daily for chronic constipation  - gastric emptying study to rule out gastroparesis   - ultrasound elastography with history of fatty liver disease  Patient Instructions   Over the counter IBGard to help with symptoms of bloating       Orders Placed This Encounter   Procedures    US Elastography Liver w/imaging     Standing Status:   Future     Standing Expiration Date:   6/10/2025     Order Specific Question:   May the Radiologist modify the order per protocol to meet the clinical needs of the patient?     Answer:   Yes     Order Specific Question:   Release to patient     Answer:   Immediate    NM Gastric Emptying     Standing Status:   Future     Standing Expiration Date:   6/10/2025     Order Specific Question:   May the Radiologist modify the order per protocol to meet the clinical needs of the patient?     Answer:   Yes     Order Specific Question:   Release to patient     Answer:   Immediate    CBC Auto Differential     Standing Status:   Future     Standing Expiration Date:   8/9/2025    Comprehensive Metabolic Panel     Standing Status:   Future     Standing Expiration Date:   8/9/2025    Hepatitis C Antibody     Standing Status:   Future     Standing Expiration Date:   8/9/2025     Order Specific Question:   Release to patient     Answer:   Immediate         The risks and benefits of my recommendations, as well as other treatment options were discussed with the patient today. All questions were answered.    35 minutes of total time spent on the encounter, which includes face to face time and non-face to face time preparing to see the patient (eg, review of tests), obtaining and/or reviewing separately obtained history, documenting clinical information in the electronic or other health record, Independently interpreting results (not separately reported)  and communicating results to the patient/family/caregiver, or care coordination (not separately reported).        Kajal Alvarez, ALEXP/ACNP  [facility]

## 2024-06-19 ENCOUNTER — HOSPITAL ENCOUNTER (OUTPATIENT)
Dept: RADIOLOGY | Facility: HOSPITAL | Age: 69
Discharge: HOME | End: 2024-06-19
Payer: MEDICARE

## 2024-06-19 ENCOUNTER — TELEPHONE (OUTPATIENT)
Dept: GASTROENTEROLOGY | Facility: CLINIC | Age: 69
End: 2024-06-19
Payer: MEDICARE

## 2024-06-19 DIAGNOSIS — R68.81 EARLY SATIETY: ICD-10-CM

## 2024-06-19 PROCEDURE — 78264 GASTRIC EMPTYING IMG STUDY: CPT | Mod: 26,,, | Performed by: RADIOLOGY

## 2024-06-19 PROCEDURE — A9541 TC99M SULFUR COLLOID: HCPCS

## 2024-06-19 PROCEDURE — 78264 GASTRIC EMPTYING IMG STUDY: CPT | Mod: TC

## 2024-06-19 RX ORDER — TECHNETIUM TC 99M SULFUR COLLOID 2 MG
549 KIT MISCELLANEOUS
Status: COMPLETED | OUTPATIENT
Start: 2024-06-19 | End: 2024-06-19

## 2024-06-19 RX ADMIN — TECHNETIUM TC 99M SULFUR COLLOID KIT 0.55 MILLICURIE: KIT at 08:06

## 2024-06-24 ENCOUNTER — HOSPITAL ENCOUNTER (OUTPATIENT)
Dept: RADIOLOGY | Facility: HOSPITAL | Age: 69
Discharge: HOME | End: 2024-06-24
Payer: MEDICARE

## 2024-06-24 DIAGNOSIS — K76.0 FATTY LIVER: ICD-10-CM

## 2024-06-24 PROCEDURE — 76981 USE PARENCHYMA: CPT | Mod: 26,,, | Performed by: STUDENT IN AN ORGANIZED HEALTH CARE EDUCATION/TRAINING PROGRAM

## 2024-06-24 PROCEDURE — 76981 USE PARENCHYMA: CPT | Mod: TC

## 2024-06-28 ENCOUNTER — TELEPHONE (OUTPATIENT)
Dept: GASTROENTEROLOGY | Facility: CLINIC | Age: 69
End: 2024-06-28
Payer: MEDICARE

## 2024-06-28 NOTE — TELEPHONE ENCOUNTER
----- Message from ANGIE Bui sent at 6/27/2024  4:14 PM CDT -----  Metavir F0 indicating no significant fibrosis or scarring.

## 2024-09-04 PROBLEM — G56.02 CARPAL TUNNEL SYNDROME OF LEFT WRIST: Status: RESOLVED | Noted: 2022-11-03 | Resolved: 2024-09-04

## 2024-09-04 PROBLEM — K59.00 CONSTIPATION: Status: RESOLVED | Noted: 2023-06-23 | Resolved: 2024-09-04

## 2024-09-04 NOTE — PROGRESS NOTES
Subjective:       Patient ID: Lydia Head is a 69 y.o. female.    Chief Complaint: CHECK UP    Regular patient of Dr. Antoine  Mammogram at [facility] next month.    Active Problem List with Overview Notes    Diagnosis Date Noted    Diabetes mellitus without complication 09/05/2024    Vitamin D deficiency 09/05/2024    Hyperlipidemia 09/05/2024    Essential hypertension, benign 09/05/2024    Esophageal stricture 09/19/2023    HH (hiatus hernia) 09/19/2023    Acute superficial gastritis without hemorrhage 09/19/2023    Dysphagia 06/23/2023    Fatty liver 06/23/2023    Pain of right lower extremity 01/27/2022    Venous insufficiency 01/27/2022    Hyperglycemia 12/09/2021    Encounter for long-term (current) use of other medications     Left hip pain     Primary insomnia         Review of Systems   Constitutional:  Negative for chills, fatigue and fever.   HENT:  Negative for congestion, hearing loss, postnasal drip, rhinorrhea, sore throat, tinnitus and voice change.    Respiratory:  Negative for apnea, cough, choking, chest tightness and shortness of breath.    Cardiovascular:  Negative for chest pain, palpitations and leg swelling.   Gastrointestinal:  Negative for abdominal pain, constipation, diarrhea and nausea.   Genitourinary:  Negative for difficulty urinating.   Neurological:  Negative for dizziness, syncope, weakness and headaches.   Psychiatric/Behavioral:  Negative for sleep disturbance.           Objective:      Vitals:    09/05/24 0817   BP: 117/73   Pulse: 80   Resp: 18      Physical Exam  Constitutional:       Appearance: Normal appearance. She is well-developed and normal weight.   HENT:      Head: Normocephalic.      Right Ear: External ear normal.      Left Ear: External ear normal.      Nose: Nose normal.      Mouth/Throat:      Lips: Pink.      Mouth: Mucous membranes are moist.      Pharynx: Oropharynx is clear.   Eyes:      General: Lids are normal.      Pupils: Pupils are equal, round, and reactive  to light.   Cardiovascular:      Rate and Rhythm: Normal rate and regular rhythm.      Pulses: Normal pulses.      Heart sounds: Normal heart sounds.   Pulmonary:      Effort: Pulmonary effort is normal.      Breath sounds: Normal breath sounds.   Abdominal:      Palpations: Abdomen is soft.   Musculoskeletal:         General: Normal range of motion.      Cervical back: Normal range of motion.   Skin:     General: Skin is warm and dry.   Neurological:      Mental Status: She is alert and oriented to person, place, and time.   Psychiatric:         Attention and Perception: Attention normal.         Mood and Affect: Mood normal.         Speech: Speech normal.         Behavior: Behavior normal. Behavior is cooperative.       Assessment:       1. Essential hypertension, benign    2. Hyperlipidemia, unspecified hyperlipidemia type    3. Vitamin D deficiency    4. Hyperglycemia    5. Diabetes mellitus without complication    6. Encounter for osteoporosis screening in asymptomatic postmenopausal patient    7. Screening for cervical cancer    8. Need for vaccination        Plan:     Problem List Items Addressed This Visit          Cardiac/Vascular    Hyperlipidemia (Chronic)    Current Assessment & Plan     The current medical regimen is effective;  continue present plan and medications.  Labs today  Followed by Evelina Marin            Relevant Orders    Lipid Panel    Essential hypertension, benign - Primary (Chronic)    Current Assessment & Plan     The current medical regimen is effective;  continue present plan and medications.  Labs today  Followed by Evelina Marin         Relevant Orders    Comprehensive Metabolic Panel    CBC Auto Differential    Lipid Panel    TSH    Hemoglobin A1C       Endocrine    Diabetes mellitus without complication (Chronic)    Current Assessment & Plan     A1C today  Controlled with diet and exercise.  Follow up in 6 mos         Vitamin D deficiency (Chronic)    Current Assessment  & Plan     Labs today.   Call results.         Relevant Orders    Vitamin D    Hyperglycemia    Relevant Orders    Hemoglobin A1C     Other Visit Diagnoses       Encounter for osteoporosis screening in asymptomatic postmenopausal patient        Relevant Orders    DXA Bone Density Axial Skeleton 1 or more sites    Screening for cervical cancer        Relevant Orders    Ambulatory referral/consult to Obstetrics / Gynecology    Need for vaccination        Relevant Medications    pneumoc 20-liam conj-dip cr(PF) (PREVNAR-20 (PF)) injection Syrg 0.5 mL (Completed)            Health Maintenance:  Health Maintenance Topics with due status: Not Due       Topic Last Completion Date    Colorectal Cancer Screening 12/11/2019    Hemoglobin A1c (Diabetic Prevention Screening) 11/03/2022    Lipid Panel 11/03/2022           Mercedez Zuniga   [facility]   9/5/24

## 2024-09-05 ENCOUNTER — PATIENT OUTREACH (OUTPATIENT)
Facility: HOSPITAL | Age: 69
End: 2024-09-05
Payer: MEDICARE

## 2024-09-05 ENCOUNTER — OFFICE VISIT (OUTPATIENT)
Dept: FAMILY MEDICINE | Facility: CLINIC | Age: 69
End: 2024-09-05
Payer: MEDICARE

## 2024-09-05 VITALS
RESPIRATION RATE: 18 BRPM | BODY MASS INDEX: 33.69 KG/M2 | OXYGEN SATURATION: 98 % | SYSTOLIC BLOOD PRESSURE: 117 MMHG | HEART RATE: 80 BPM | WEIGHT: 197.31 LBS | HEIGHT: 64 IN | DIASTOLIC BLOOD PRESSURE: 73 MMHG

## 2024-09-05 DIAGNOSIS — I10 ESSENTIAL HYPERTENSION, BENIGN: Primary | ICD-10-CM

## 2024-09-05 DIAGNOSIS — Z78.0 ENCOUNTER FOR OSTEOPOROSIS SCREENING IN ASYMPTOMATIC POSTMENOPAUSAL PATIENT: ICD-10-CM

## 2024-09-05 DIAGNOSIS — Z12.4 SCREENING FOR CERVICAL CANCER: ICD-10-CM

## 2024-09-05 DIAGNOSIS — Z13.820 ENCOUNTER FOR OSTEOPOROSIS SCREENING IN ASYMPTOMATIC POSTMENOPAUSAL PATIENT: ICD-10-CM

## 2024-09-05 DIAGNOSIS — E55.9 VITAMIN D DEFICIENCY: ICD-10-CM

## 2024-09-05 DIAGNOSIS — E11.9 DIABETES MELLITUS WITHOUT COMPLICATION: ICD-10-CM

## 2024-09-05 DIAGNOSIS — Z23 NEED FOR VACCINATION: ICD-10-CM

## 2024-09-05 DIAGNOSIS — E78.5 HYPERLIPIDEMIA, UNSPECIFIED HYPERLIPIDEMIA TYPE: ICD-10-CM

## 2024-09-05 DIAGNOSIS — R73.9 HYPERGLYCEMIA: ICD-10-CM

## 2024-09-05 LAB
25(OH)D3 SERPL-MCNC: 50.4 NG/ML
ALBUMIN SERPL BCP-MCNC: 3.7 G/DL (ref 3.5–5)
ALBUMIN/GLOB SERPL: 1 {RATIO}
ALP SERPL-CCNC: 84 U/L (ref 55–142)
ALT SERPL W P-5'-P-CCNC: 28 U/L (ref 13–56)
ANION GAP SERPL CALCULATED.3IONS-SCNC: 11 MMOL/L (ref 7–16)
AST SERPL W P-5'-P-CCNC: 17 U/L (ref 15–37)
BASOPHILS # BLD AUTO: 0.04 K/UL (ref 0–0.2)
BASOPHILS NFR BLD AUTO: 1.2 % (ref 0–1)
BILIRUB SERPL-MCNC: 0.2 MG/DL (ref ?–1.2)
BUN SERPL-MCNC: 15 MG/DL (ref 7–18)
BUN/CREAT SERPL: 15 (ref 6–20)
CALCIUM SERPL-MCNC: 9 MG/DL (ref 8.5–10.1)
CHLORIDE SERPL-SCNC: 115 MMOL/L (ref 98–107)
CHOLEST SERPL-MCNC: 186 MG/DL (ref 0–200)
CHOLEST/HDLC SERPL: 3.5 {RATIO}
CO2 SERPL-SCNC: 21 MMOL/L (ref 21–32)
CREAT SERPL-MCNC: 0.99 MG/DL (ref 0.55–1.02)
DIFFERENTIAL METHOD BLD: ABNORMAL
EGFR (NO RACE VARIABLE) (RUSH/TITUS): 63 ML/MIN/1.73M2
EOSINOPHIL # BLD AUTO: 0.08 K/UL (ref 0–0.5)
EOSINOPHIL NFR BLD AUTO: 2.5 % (ref 1–4)
ERYTHROCYTE [DISTWIDTH] IN BLOOD BY AUTOMATED COUNT: 14.6 % (ref 11.5–14.5)
GLOBULIN SER-MCNC: 3.6 G/DL (ref 2–4)
GLUCOSE SERPL-MCNC: 83 MG/DL (ref 74–106)
HCT VFR BLD AUTO: 40.1 % (ref 38–47)
HDLC SERPL-MCNC: 53 MG/DL (ref 40–60)
HGB BLD-MCNC: 12.8 G/DL (ref 12–16)
IMM GRANULOCYTES # BLD AUTO: 0.01 K/UL (ref 0–0.04)
IMM GRANULOCYTES NFR BLD: 0.3 % (ref 0–0.4)
LDLC SERPL CALC-MCNC: 112 MG/DL
LDLC/HDLC SERPL: 2.1 {RATIO}
LYMPHOCYTES # BLD AUTO: 1.4 K/UL (ref 1–4.8)
LYMPHOCYTES NFR BLD AUTO: 43.1 % (ref 27–41)
MCH RBC QN AUTO: 26 PG (ref 27–31)
MCHC RBC AUTO-ENTMCNC: 31.9 G/DL (ref 32–36)
MCV RBC AUTO: 81.3 FL (ref 80–96)
MONOCYTES # BLD AUTO: 0.37 K/UL (ref 0–0.8)
MONOCYTES NFR BLD AUTO: 11.4 % (ref 2–6)
MPC BLD CALC-MCNC: 10.4 FL (ref 9.4–12.4)
NEUTROPHILS # BLD AUTO: 1.35 K/UL (ref 1.8–7.7)
NEUTROPHILS NFR BLD AUTO: 41.5 % (ref 53–65)
NONHDLC SERPL-MCNC: 133 MG/DL
NRBC # BLD AUTO: 0 X10E3/UL
NRBC, AUTO (.00): 0 %
PLATELET # BLD AUTO: 288 K/UL (ref 150–400)
POTASSIUM SERPL-SCNC: 4.2 MMOL/L (ref 3.5–5.1)
PROT SERPL-MCNC: 7.3 G/DL (ref 6.4–8.2)
RBC # BLD AUTO: 4.93 M/UL (ref 4.2–5.4)
SODIUM SERPL-SCNC: 143 MMOL/L (ref 136–145)
TRIGL SERPL-MCNC: 103 MG/DL (ref 35–150)
TSH SERPL DL<=0.005 MIU/L-ACNC: 2.29 UIU/ML (ref 0.36–3.74)
VLDLC SERPL-MCNC: 21 MG/DL
WBC # BLD AUTO: 3.25 K/UL (ref 4.5–11)

## 2024-09-05 PROCEDURE — 82306 VITAMIN D 25 HYDROXY: CPT | Mod: ,,, | Performed by: CLINICAL MEDICAL LABORATORY

## 2024-09-05 PROCEDURE — 84443 ASSAY THYROID STIM HORMONE: CPT | Mod: ,,, | Performed by: CLINICAL MEDICAL LABORATORY

## 2024-09-05 PROCEDURE — 85025 COMPLETE CBC W/AUTO DIFF WBC: CPT | Mod: ,,, | Performed by: CLINICAL MEDICAL LABORATORY

## 2024-09-05 PROCEDURE — 80053 COMPREHEN METABOLIC PANEL: CPT | Mod: ,,, | Performed by: CLINICAL MEDICAL LABORATORY

## 2024-09-05 PROCEDURE — 80061 LIPID PANEL: CPT | Mod: ,,, | Performed by: CLINICAL MEDICAL LABORATORY

## 2024-09-05 NOTE — ASSESSMENT & PLAN NOTE
The current medical regimen is effective;  continue present plan and medications.  Labs today  Followed by Evelina Marin

## 2024-09-05 NOTE — PROGRESS NOTES
Population Health Chart Review & Patient Outreach Details    Updates Requested / Reviewed:  [x]  Care Team Updated    Health Maintenance Topics Addressed and Outreach Outcomes / Actions Taken:  Breast Cancer Screening [x] ANALISA sent to [facility].

## 2024-09-05 NOTE — LETTER
AUTHORIZATION FOR RELEASE OF   CONFIDENTIAL INFORMATION    Dear [facility],    We are seeing Lydia Head, date of birth 1955, in the clinic at [facility]. Jose Antoine MD is the patient's PCP. Lydia Head has an outstanding lab/procedure at the time we reviewed her chart. In order to help keep her health information updated, she has authorized us to request the following medical record(s):        ( x )  MAMMOGRAM                                      (  )  COLONOSCOPY      (  )  PAP SMEAR                                          (  )  OUTSIDE LAB RESULTS     (  )  DEXA SCAN                                          (  )  EYE EXAM            (  )  FOOT EXAM                                          (  )  ENTIRE RECORD     (  )  OUTSIDE IMMUNIZATIONS                 (  )  _______________         Please fax records to Aba Van LPN Care Coordinator at [phone].      If you have any questions, please contact Aba at [phone].           Patient Name: Lydia Head  : 1955  Patient Phone #: [phone]

## 2024-09-06 DIAGNOSIS — Z96.641 STATUS POST TOTAL HIP REPLACEMENT, RIGHT: Primary | ICD-10-CM

## 2024-09-06 LAB
EST. AVERAGE GLUCOSE BLD GHB EST-MCNC: 123 MG/DL
HBA1C MFR BLD HPLC: 5.9 % (ref 4.5–6.6)

## 2024-09-09 ENCOUNTER — HOSPITAL ENCOUNTER (OUTPATIENT)
Dept: RADIOLOGY | Facility: HOSPITAL | Age: 69
Discharge: HOME | End: 2024-09-09
Attending: ORTHOPAEDIC SURGERY
Payer: MEDICARE

## 2024-09-09 ENCOUNTER — HOSPITAL ENCOUNTER (OUTPATIENT)
Dept: RADIOLOGY | Facility: HOSPITAL | Age: 69
Discharge: HOME | End: 2024-09-09
Attending: NURSE PRACTITIONER
Payer: MEDICARE

## 2024-09-09 ENCOUNTER — OFFICE VISIT (OUTPATIENT)
Dept: ORTHOPEDICS | Facility: CLINIC | Age: 69
End: 2024-09-09
Payer: MEDICARE

## 2024-09-09 DIAGNOSIS — Z96.641 STATUS POST TOTAL HIP REPLACEMENT, RIGHT: ICD-10-CM

## 2024-09-09 DIAGNOSIS — Z71.89 COMPLEX CARE COORDINATION: ICD-10-CM

## 2024-09-09 DIAGNOSIS — Z96.641 STATUS POST TOTAL HIP REPLACEMENT, RIGHT: Primary | ICD-10-CM

## 2024-09-09 PROCEDURE — 99213 OFFICE O/P EST LOW 20 MIN: CPT | Mod: S$PBB,,, | Performed by: NURSE PRACTITIONER

## 2024-09-09 PROCEDURE — 99213 OFFICE O/P EST LOW 20 MIN: CPT | Mod: PBBFAC,25 | Performed by: NURSE PRACTITIONER

## 2024-09-09 PROCEDURE — 73502 X-RAY EXAM HIP UNI 2-3 VIEWS: CPT | Mod: TC,RT

## 2024-09-09 PROCEDURE — 99999 PR PBB SHADOW E&M-EST. PATIENT-LVL III: CPT | Mod: PBBFAC,,, | Performed by: NURSE PRACTITIONER

## 2024-09-09 PROCEDURE — 73502 X-RAY EXAM HIP UNI 2-3 VIEWS: CPT | Mod: 26,RT,, | Performed by: RADIOLOGY

## 2024-09-09 RX ORDER — NAPROXEN 500 MG/1
500 TABLET ORAL 2 TIMES DAILY WITH MEALS
Qty: 30 TABLET | Refills: 0 | Status: SHIPPED | OUTPATIENT
Start: 2024-09-09

## 2024-09-09 NOTE — PROGRESS NOTES
69 y.o. Female returns to clinic for a follow up visit regarding     ICD-10-CM ICD-9-CM   1. Status post total hip replacement, right  Z96.641 V43.64        Patient had right GRZEGORZ in  by Dr. Jim Cornelius.   She states that she has started having a sharp pain. From time to time. This started about 3 weeks ago, pain is in her groin.  Reports he is not constant she will have a sharp pain in the neck goes away.  She was seen by Dr. Cornelius in  and had therapy at home exercise program at that time.  She mainly wanted to have her hip checked today.    She has not been to PT recently.   She is not taking any type of anti-inflammatories. She takes Tylenol when needed.        Past Medical History:   Diagnosis Date    Acquired cyst of kidney     Coronary arteriosclerosis     Encounter for long-term (current) use of other medications     Fatigue     Hx of screening mammography 2018    Hx of screening mammography 2021    [facility]    Hyperlipidemia     Hypertension     Multiple joint pain     Nutritional anemia     Vitamin D deficiency      Past Surgical History:   Procedure Laterality Date     SECTION      HIP REPLACEMENT ARTHROPLASTY Right     HYSTERECTOMY      ROTATOR CUFF REPAIR Right     procedure done twice per Dr. Jim Cornelius.    TOTAL REPLACEMENT OF BOTH HIP JOINTS USING COMPUTER-ASSISTED NAVIGATION Right     Performed by Dr. Jim Cornelius.    VAGINAL DELIVERY      x 2         PHYSICAL EXAMINATION:    General    Constitutional: She is oriented to person, place, and time. She appears well-nourished.   HENT:   Head: Normocephalic and atraumatic.   Eyes: Pupils are equal, round, and reactive to light.   Neck: Neck supple.   Cardiovascular:  Normal rate and regular rhythm.            Pulmonary/Chest: Effort normal. No respiratory distress.   Abdominal: There is no abdominal tenderness. There is no guarding.   Neurological: She is alert and oriented to person, place, and time. She has normal reflexes.    Psychiatric: She has a normal mood and affect. Her behavior is normal. Judgment and thought content normal.             Right Hip Exam     Inspection   Scars: present  Swelling: absent  Bruising: absent  Erythema: absent    Range of Motion   Extension:  normal   Flexion:  normal   External rotation:  normal   Internal rotation:  normal     Tests   Pain w/ forced internal rotation (RADHA): present  Pain w/ forced external rotation (FADIR): present    Other   Sensation: normal      Vascular Exam     Right Pulses  Dorsalis Pedis:      2+          IMAGING:  No results found.   EXAMINATION:  XR HIP WITH PELVIS WHEN PERFORMED 2 OR 3 VIEWS RIGHT     CLINICAL HISTORY:  Presence of right artificial hip joint     TECHNIQUE:  AP view of the pelvis and frog leg lateral view of the right hip were performed.     COMPARISON:  08/25/2022     FINDINGS:  There is a noncemented right total hip arthroplasty.  No evidence for loosening, migration, or periprosthetic fracture.     Impression:     Right total hip arthroplasty without complication.        Electronically signed by:Ty Hui MD  Date:                                            09/09/2024  Time:                                           09:42           Exam Ended: 09/09/24 08:27 CDT Last Resulted: 09/09/24 09:42 CDT           ASSESSMENT:      ICD-10-CM ICD-9-CM   1. Status post total hip replacement, right  Z96.641 V43.64       PLAN:     -Findings and treatment options were discussed with the patient  -All questions answered      We will continue her home exercise program.  Naproxen p.o. b.i.d..  Return to clinic in 2 months with Dr. Cornelius if pain is no better    There are no Patient Instructions on file for this visit.      Orders Placed This Encounter   Procedures    X-Ray Hip 2 or 3 views Right with Pelvis when performed         Procedures

## 2024-09-16 ENCOUNTER — PATIENT OUTREACH (OUTPATIENT)
Facility: HOSPITAL | Age: 69
End: 2024-09-16
Payer: MEDICARE

## 2024-09-16 NOTE — PROGRESS NOTES
Population Health Chart Review & Patient Outreach Details      Health Maintenance Topics Addressed and Outreach Outcomes / Actions Taken:  Breast Cancer Screening [x] HM updated with mammogram on 11/30/2023 at [facility].

## 2024-09-26 ENCOUNTER — HOSPITAL ENCOUNTER (OUTPATIENT)
Dept: RADIOLOGY | Facility: HOSPITAL | Age: 69
Discharge: HOME | End: 2024-09-26
Attending: NURSE PRACTITIONER
Payer: MEDICARE

## 2024-09-26 DIAGNOSIS — Z78.0 ENCOUNTER FOR OSTEOPOROSIS SCREENING IN ASYMPTOMATIC POSTMENOPAUSAL PATIENT: ICD-10-CM

## 2024-09-26 DIAGNOSIS — Z13.820 ENCOUNTER FOR OSTEOPOROSIS SCREENING IN ASYMPTOMATIC POSTMENOPAUSAL PATIENT: ICD-10-CM

## 2024-09-26 PROCEDURE — 77080 DXA BONE DENSITY AXIAL: CPT | Mod: TC

## 2024-09-26 PROCEDURE — 77080 DXA BONE DENSITY AXIAL: CPT | Mod: 26,,, | Performed by: RADIOLOGY

## 2024-11-25 ENCOUNTER — OFFICE VISIT (OUTPATIENT)
Dept: FAMILY MEDICINE | Facility: CLINIC | Age: 69
End: 2024-11-25
Payer: MEDICARE

## 2024-11-25 VITALS
WEIGHT: 205 LBS | DIASTOLIC BLOOD PRESSURE: 98 MMHG | OXYGEN SATURATION: 98 % | HEART RATE: 80 BPM | BODY MASS INDEX: 35 KG/M2 | HEIGHT: 64 IN | RESPIRATION RATE: 20 BRPM | TEMPERATURE: 99 F | SYSTOLIC BLOOD PRESSURE: 140 MMHG

## 2024-11-25 DIAGNOSIS — J01.90 ACUTE NON-RECURRENT SINUSITIS, UNSPECIFIED LOCATION: Primary | ICD-10-CM

## 2024-11-25 DIAGNOSIS — R51.9 SINUS HEADACHE: ICD-10-CM

## 2024-11-25 DIAGNOSIS — Z20.822 CONTACT WITH AND (SUSPECTED) EXPOSURE TO COVID-19: ICD-10-CM

## 2024-11-25 LAB
CTP QC/QA: YES
MOLECULAR STREP A: NEGATIVE
POC MOLECULAR INFLUENZA A AGN: NEGATIVE
POC MOLECULAR INFLUENZA B AGN: NEGATIVE
SARS-COV-2 RDRP RESP QL NAA+PROBE: NEGATIVE

## 2024-11-25 PROCEDURE — 87651 STREP A DNA AMP PROBE: CPT | Mod: RHCUB | Performed by: NURSE PRACTITIONER

## 2024-11-25 PROCEDURE — 96372 THER/PROPH/DIAG INJ SC/IM: CPT | Mod: ,,, | Performed by: NURSE PRACTITIONER

## 2024-11-25 PROCEDURE — 87502 INFLUENZA DNA AMP PROBE: CPT | Mod: RHCUB | Performed by: NURSE PRACTITIONER

## 2024-11-25 PROCEDURE — 87635 SARS-COV-2 COVID-19 AMP PRB: CPT | Mod: RHCUB | Performed by: NURSE PRACTITIONER

## 2024-11-25 PROCEDURE — 99214 OFFICE O/P EST MOD 30 MIN: CPT | Mod: ,,, | Performed by: NURSE PRACTITIONER

## 2024-11-25 RX ORDER — LORATADINE 10 MG/1
10 TABLET ORAL DAILY
Qty: 30 TABLET | Refills: 0 | Status: SHIPPED | OUTPATIENT
Start: 2024-11-25 | End: 2024-12-25

## 2024-11-25 RX ORDER — DEXAMETHASONE SODIUM PHOSPHATE 4 MG/ML
6 INJECTION, SOLUTION INTRA-ARTICULAR; INTRALESIONAL; INTRAMUSCULAR; INTRAVENOUS; SOFT TISSUE
Status: COMPLETED | OUTPATIENT
Start: 2024-11-25 | End: 2024-11-25

## 2024-11-25 RX ORDER — KETOROLAC TROMETHAMINE 30 MG/ML
30 INJECTION, SOLUTION INTRAMUSCULAR; INTRAVENOUS
Status: COMPLETED | OUTPATIENT
Start: 2024-11-25 | End: 2024-11-25

## 2024-11-25 RX ORDER — AMOXICILLIN AND CLAVULANATE POTASSIUM 875; 125 MG/1; MG/1
1 TABLET, FILM COATED ORAL 2 TIMES DAILY
Qty: 20 TABLET | Refills: 0 | Status: SHIPPED | OUTPATIENT
Start: 2024-11-25 | End: 2024-12-05

## 2024-11-25 RX ORDER — PREDNISONE 20 MG/1
20 TABLET ORAL DAILY
Qty: 5 TABLET | Refills: 0 | Status: SHIPPED | OUTPATIENT
Start: 2024-11-25

## 2024-11-25 RX ORDER — FLUTICASONE PROPIONATE 50 MCG
2 SPRAY, SUSPENSION (ML) NASAL DAILY
Qty: 16 G | Refills: 0 | Status: SHIPPED | OUTPATIENT
Start: 2024-11-25

## 2024-11-25 RX ADMIN — DEXAMETHASONE SODIUM PHOSPHATE 6 MG: 4 INJECTION, SOLUTION INTRA-ARTICULAR; INTRALESIONAL; INTRAMUSCULAR; INTRAVENOUS; SOFT TISSUE at 09:11

## 2024-11-25 RX ADMIN — KETOROLAC TROMETHAMINE 30 MG: 30 INJECTION, SOLUTION INTRAMUSCULAR; INTRAVENOUS at 09:11

## 2024-11-25 NOTE — PROGRESS NOTES
Subjective:       Patient ID: Lydia Head is a 69 y.o. female.    Chief Complaint: Headache, Nasal Congestion, Sore Throat (ONSET:  POST 5 DAYS OR MORE.  HAS TAKEN OTC MEDS.  ), and Generalized Body Aches    Headache, Nasal Congestion, Sore Throat and Generalized Body Aches      Headache   Associated symptoms include a sore throat. Pertinent negatives include no abdominal pain, back pain, coughing, dizziness, ear pain, eye pain, fever, nausea, neck pain, vomiting or weakness.   Sore Throat   Associated symptoms include congestion and headaches. Pertinent negatives include no abdominal pain, coughing, ear pain, neck pain, shortness of breath or vomiting.   Review of Systems   Constitutional:  Negative for appetite change, fatigue and fever.   HENT:  Positive for nasal congestion and sore throat. Negative for ear pain.    Eyes:  Negative for pain, discharge and itching.   Respiratory:  Negative for cough and shortness of breath.    Cardiovascular:  Negative for chest pain and leg swelling.   Gastrointestinal:  Negative for abdominal pain, change in bowel habit, nausea and vomiting.   Musculoskeletal:  Positive for myalgias. Negative for back pain, gait problem and neck pain.   Integumentary:  Negative for rash and wound.   Allergic/Immunologic: Negative for immunocompromised state.   Neurological:  Positive for headaches. Negative for dizziness and weakness.   All other systems reviewed and are negative.      Objective:      Physical Exam  Vitals and nursing note reviewed.   Constitutional:       General: She is not in acute distress.     Appearance: Normal appearance. She is not ill-appearing, toxic-appearing or diaphoretic.   HENT:      Head: Normocephalic.      Right Ear: Tympanic membrane, ear canal and external ear normal.      Left Ear: Tympanic membrane, ear canal and external ear normal.      Nose: Mucosal edema, congestion and rhinorrhea present.      Right Turbinates: Swollen.      Left Turbinates: Swollen.       Right Sinus: Maxillary sinus tenderness and frontal sinus tenderness present.      Left Sinus: Maxillary sinus tenderness and frontal sinus tenderness present.      Mouth/Throat:      Mouth: Mucous membranes are moist.      Pharynx: Posterior oropharyngeal erythema present. No oropharyngeal exudate.   Eyes:      General: No scleral icterus.        Right eye: No discharge.         Left eye: No discharge.      Extraocular Movements: Extraocular movements intact.      Conjunctiva/sclera: Conjunctivae normal.      Pupils: Pupils are equal, round, and reactive to light.   Cardiovascular:      Rate and Rhythm: Normal rate and regular rhythm.      Pulses: Normal pulses.      Heart sounds: Normal heart sounds. No murmur heard.  Pulmonary:      Effort: Pulmonary effort is normal. No respiratory distress.      Breath sounds: Normal breath sounds. No wheezing, rhonchi or rales.   Musculoskeletal:         General: Normal range of motion.      Cervical back: Neck supple. No tenderness.   Lymphadenopathy:      Cervical: No cervical adenopathy.   Skin:     General: Skin is warm and dry.      Capillary Refill: Capillary refill takes less than 2 seconds.      Findings: No rash.   Neurological:      Mental Status: She is alert and oriented to person, place, and time.   Psychiatric:         Mood and Affect: Mood normal.         Behavior: Behavior normal.         Thought Content: Thought content normal.         Judgment: Judgment normal.          Assessment:       1. Acute non-recurrent sinusitis, unspecified location    2. Contact with and (suspected) exposure to covid-19    3. Sinus headache        Plan:   Acute non-recurrent sinusitis, unspecified location  -     amoxicillin-clavulanate 875-125mg (AUGMENTIN) 875-125 mg per tablet; Take 1 tablet by mouth 2 (two) times daily. for 20 doses  Dispense: 20 tablet; Refill: 0  -     fluticasone propionate (FLONASE) 50 mcg/actuation nasal spray; 2 sprays (100 mcg total) by Each Nostril  route once daily.  Dispense: 16 g; Refill: 0  -     loratadine (CLARITIN) 10 mg tablet; Take 1 tablet (10 mg total) by mouth once daily. for 30 doses  Dispense: 30 tablet; Refill: 0  -     predniSONE (DELTASONE) 20 MG tablet; Take 1 tablet (20 mg total) by mouth once daily.  Dispense: 5 tablet; Refill: 0    Contact with and (suspected) exposure to covid-19  -     POCT COVID-19 Rapid Screening  -     POCT Strep A, Molecular  -     POCT Influenza A/B Molecular    Sinus headache  -     dexAMETHasone injection 6 mg  -     ketorolac injection 30 mg           Risks, benefits, and side effects were discussed with the patient. All questions were answered to the fullest satisfaction of the patient, and pt verbalized understanding and agreement to treatment plan. Pt was to call with any new or worsening symptoms, or present to the ER

## 2024-12-06 ENCOUNTER — PATIENT OUTREACH (OUTPATIENT)
Facility: HOSPITAL | Age: 69
End: 2024-12-06
Payer: MEDICARE

## 2024-12-06 NOTE — PROGRESS NOTES
Population Health Chart Review & Patient Outreach Details    Updates Requested / Reviewed:  [x]  Care Everywhere Updated & Reviewed    Health Maintenance Topics Addressed and Outreach Outcomes / Actions Taken:  Breast Cancer Screening [x]  Updated with December 2024 Mammogram abstracted from Care Everywhere. History Updated.

## 2024-12-10 ENCOUNTER — OFFICE VISIT (OUTPATIENT)
Dept: GASTROENTEROLOGY | Facility: CLINIC | Age: 69
End: 2024-12-10
Payer: MEDICARE

## 2024-12-10 VITALS
BODY MASS INDEX: 34.89 KG/M2 | HEART RATE: 76 BPM | DIASTOLIC BLOOD PRESSURE: 77 MMHG | WEIGHT: 204.38 LBS | SYSTOLIC BLOOD PRESSURE: 132 MMHG | HEIGHT: 64 IN | OXYGEN SATURATION: 94 %

## 2024-12-10 DIAGNOSIS — K76.0 FATTY LIVER: ICD-10-CM

## 2024-12-10 DIAGNOSIS — K59.04 CHRONIC IDIOPATHIC CONSTIPATION: Primary | ICD-10-CM

## 2024-12-10 PROCEDURE — 99214 OFFICE O/P EST MOD 30 MIN: CPT | Mod: S$PBB,,,

## 2024-12-10 PROCEDURE — 99999 PR PBB SHADOW E&M-EST. PATIENT-LVL IV: CPT | Mod: PBBFAC,,,

## 2024-12-10 PROCEDURE — 99214 OFFICE O/P EST MOD 30 MIN: CPT | Mod: PBBFAC

## 2024-12-10 NOTE — PROGRESS NOTES
Gastroenterology Clinic Note    Patient ID: [id number]   Referring MD: Jose Antoine MD   Chief Complaint:   Chief Complaint   Patient presents with    Follow-up     No problems       History of Present Illness   Lydia Head is an 69 y.o. AAF who is referred for follow-up.  Patient previously followed in GI Clinic by ANGIE Barry for fatty liver and history of dysphagia requiring EGD with dilation.  Her only complaint today is intermittent bloating.  She denies any constipation or diarrhea.  She takes MiraLax daily and reports soft bowel movements with this.  She denies nausea.  She is taking Pepcid 20 mg b.i.d. for GERD.  She denies any unintentional weight loss.  She does not associate the bloating with her meals.  She reports the symptoms occur randomly throughout the day.  She does experience early satiety.  She denies any dysphagia at present.    Previous workup:  EGD (09/2023); Ultrasound abdomen limited liver    Last colonoscopy was 12/11/2019 with 10 year recall for screening purposes.    Interval   - patient had a normal gastric emptying study 06/2024  - ultrasound elastography 06/2024 revealed Metavir F0  - she has no GI related complaints on exam today; she was previously experiencing bloating that has resolved  - she continues to use MiraLax as needed for constipation and this works well for her  - no dysphagia reported    Review of Systems   Constitutional:  Negative for weight loss.   Gastrointestinal:  Positive for constipation. Negative for abdominal pain, blood in stool, diarrhea, heartburn, melena, nausea and vomiting.       Past Medical History      Past Medical History:   Diagnosis Date    Acquired cyst of kidney     Breast cancer screening by mammogram 12/02/2024    [facility]    Coronary arteriosclerosis     Encounter for long-term (current) use of other medications     Fatigue     Hx of screening mammography 09/07/2018    Hx of screening mammography [facility]/22/2021    [facility]     Hyperlipidemia     Hypertension     Multiple joint pain     Nutritional anemia     Vitamin D deficiency        Past Surgical History     Past Surgical History:   Procedure Laterality Date     SECTION      HIP REPLACEMENT ARTHROPLASTY Right     HYSTERECTOMY      ROTATOR CUFF REPAIR Right     procedure done twice per Dr. Jim Cornelius.    TOTAL REPLACEMENT OF BOTH HIP JOINTS USING COMPUTER-ASSISTED NAVIGATION Right     Performed by Dr. Jim Cornelius.    VAGINAL DELIVERY      x 2       Allergies     Review of patient's allergies indicates:   Allergen Reactions    Codeine Nausea And Vomiting       Immunization History     Immunization History   Administered Date(s) Administered    Influenza 2020    Influenza (FLUAD) - Quadrivalent - Adjuvanted - PF *Preferred* (65+) 2022    Influenza - Quadrivalent - PF *Preferred* (6 months and older) 2021    Pneumococcal Conjugate - 20 Valent 2024    Zoster Recombinant 2021, 2022       Past Family History      Family History   Problem Relation Name Age of Onset    Diabetes Mother          age 70    Lung cancer Mother      Breast cancer Mother      Alzheimer's disease Father          age 72    Diabetes Father      No Known Problems Sister          covid age 60    COPD Brother          age 60's    Lung cancer Brother          age 50's    No Known Problems Daughter          hypotension age 40    No Known Problems Son          pre diabetic age 43    No Known Problems Son          age 36    COPD Brother          age 69    No Known Problems Brother          age 66    Asthma Sister          age 75    Breast cancer Sister      Breast cancer Sister          age 72    No Known Problems Sister          age 63       Past Social History      Social History     Socioeconomic History    Marital status:    Tobacco Use    Smoking status: Never    Smokeless tobacco: Never   Substance and Sexual Activity    Alcohol use: Never    Drug use: Never    Sexual  "activity: Not Currently     Partners: Male     Birth control/protection: See Surgical Hx     Comment: hysterectomy       Current Medications     Outpatient Medications Marked as Taking for the 12/10/24 encounter (Office Visit) with Kajal Alvarez FNP   Medication Sig Dispense Refill    aspirin (ECOTRIN) 81 MG EC tablet Take 81 mg by mouth once daily.      atorvastatin (LIPITOR) 10 MG tablet Take 10 mg by mouth once daily.      calcium citrate-vitamin D3 315-200 mg (CITRACAL+D) 315 mg-5 mcg (200 unit) per tablet Take 2 tablets by mouth once daily.      chlorthalidone (HYGROTEN) 25 MG Tab 1/2 tab by mouth daily      fluticasone propionate (FLONASE) 50 mcg/actuation nasal spray 2 sprays (100 mcg total) by Each Nostril route once daily. 16 g 0    loratadine (CLARITIN) 10 mg tablet Take 1 tablet (10 mg total) by mouth once daily. for 30 doses 30 tablet 0    metoprolol succinate (TOPROL-XL) 25 MG 24 hr tablet Take 1 tablet by mouth once daily.      naproxen (NAPROSYN) 500 MG tablet Take 1 tablet (500 mg total) by mouth 2 (two) times daily with meals. 30 tablet 0    potassium chloride SA (K-DUR,KLOR-CON) 20 MEQ tablet Take 1 tablet by mouth 2 (two) times a day.      predniSONE (DELTASONE) 20 MG tablet Take 1 tablet (20 mg total) by mouth once daily. 5 tablet 0        I have reviewed the current medications, allergies, vital signs, past medical and surgical history, family medical history, and social history for this encounter and agree with all findings.    OBJECTIVE    Physical Exam    /77   Pulse 76   Ht 5' 4" (1.626 m)   Wt 92.7 kg (204 lb 6.4 oz)   SpO2 (!) 94%   BMI 35.09 kg/m²   GEN: Well appearing, cooperative, NAD  NECK: Supple, no LAD  CV: Normal rate  RESP: Unlabored  ABD: ND, NT, soft, no guarding  EXT: No clubbing, cyanosis, or edema  SKIN: Warm and dry  NEURO: AAO x4.     LABS    CBC (with or without Differential):   Lab Results   Component Value Date    WBC 3.25 (L) 09/05/2024    HGB 12.8 " 09/05/2024    HCT 40.1 09/05/2024    MCV 81.3 09/05/2024    MCH 26.0 (L) 09/05/2024    MCHC 31.9 (L) 09/05/2024    RDW 14.6 (H) 09/05/2024     09/05/2024    MPV 10.4 09/05/2024    NEUTOPHILPCT 41.5 (L) 09/05/2024    DIFFTYPE Auto 09/05/2024     BMP/CMP:   Lab Results   Component Value Date     09/05/2024    K 4.2 09/05/2024     (H) 09/05/2024    CO2 21 09/05/2024    BUN 15 09/05/2024    CREATININE 0.99 09/05/2024    GLU 83 09/05/2024    CALCIUM 9.0 09/05/2024    ALBUMIN 3.7 09/05/2024    AST 17 09/05/2024    ALT 28 09/05/2024    ALKPHOS 84 09/05/2024    MG 2.5 (H) 11/03/2022        IMAGING  US Elastography 06/2024  - Metavir score F0     Ultrasound abdomen limited liver 10/2023  - fatty liver infiltration.  Simple appearing right renal cyst.  No other evidence of abnormality demonstrated.    ASSESSMENT  Lydia Head is a 69 y.o. AAF with history hypertension, hyperlipidemia, GERD, and fatty liver who is referred for follow-up.    1. Chronic idiopathic constipation    2. Fatty liver             PLAN    - continue MiraLax daily for chronic constipation  - return to GI clinic in one year for follow-up of fatty liver with labs and imaging   Patient Instructions   - Take all of your medicines as instructed.  - Avoid alcohol. Alcohol can make liver problems worse.  - Eat a healthy diet with plenty of vegetables, fruits, and whole grains.  - Get regular physical activity. Even gentle activity, like walking, is good for your health.  - Treat your high blood sugar if you have diabetes  - Treat your high cholesterol if you have it         No orders of the defined types were placed in this encounter.        The risks and benefits of my recommendations, as well as other treatment options were discussed with the patient today. All questions were answered.    30 minutes of total time spent on the encounter, which includes face to face time and non-face to face time preparing to see the patient (eg, review of  tests), obtaining and/or reviewing separately obtained history, documenting clinical information in the electronic or other health record, Independently interpreting results (not separately reported) and communicating results to the patient/family/caregiver, or care coordination (not separately reported).        ALEX BuiP/ACNP  [facility]

## 2024-12-29 ENCOUNTER — OFFICE VISIT (OUTPATIENT)
Dept: FAMILY MEDICINE | Facility: CLINIC | Age: 69
End: 2024-12-29
Payer: MEDICARE

## 2024-12-29 VITALS
DIASTOLIC BLOOD PRESSURE: 79 MMHG | BODY MASS INDEX: 34.49 KG/M2 | OXYGEN SATURATION: 97 % | WEIGHT: 202 LBS | RESPIRATION RATE: 20 BRPM | SYSTOLIC BLOOD PRESSURE: 145 MMHG | HEART RATE: 76 BPM | HEIGHT: 64 IN | TEMPERATURE: 98 F

## 2024-12-29 DIAGNOSIS — Z20.828 CONTACT WITH OR EXPOSURE TO VIRAL DISEASE: ICD-10-CM

## 2024-12-29 DIAGNOSIS — J02.9 SORE THROAT: ICD-10-CM

## 2024-12-29 DIAGNOSIS — J01.00 ACUTE NON-RECURRENT MAXILLARY SINUSITIS: Primary | ICD-10-CM

## 2024-12-29 PROCEDURE — 87502 INFLUENZA DNA AMP PROBE: CPT | Mod: RHCUB | Performed by: NURSE PRACTITIONER

## 2024-12-29 PROCEDURE — 87635 SARS-COV-2 COVID-19 AMP PRB: CPT | Mod: RHCUB | Performed by: NURSE PRACTITIONER

## 2024-12-29 PROCEDURE — 99214 OFFICE O/P EST MOD 30 MIN: CPT | Mod: ,,, | Performed by: NURSE PRACTITIONER

## 2024-12-29 PROCEDURE — 87651 STREP A DNA AMP PROBE: CPT | Mod: RHCUB | Performed by: NURSE PRACTITIONER

## 2024-12-29 RX ORDER — AZELASTINE 1 MG/ML
1 SPRAY, METERED NASAL 2 TIMES DAILY
Qty: 30 ML | Refills: 0 | Status: SHIPPED | OUTPATIENT
Start: 2024-12-29 | End: 2025-12-29

## 2024-12-29 RX ORDER — METHYLPREDNISOLONE 4 MG/1
TABLET ORAL
Qty: 21 EACH | Refills: 0 | Status: SHIPPED | OUTPATIENT
Start: 2024-12-29 | End: 2025-01-19

## 2024-12-29 RX ORDER — CEFDINIR 300 MG/1
300 CAPSULE ORAL 2 TIMES DAILY
Qty: 20 CAPSULE | Refills: 0 | Status: SHIPPED | OUTPATIENT
Start: 2024-12-29 | End: 2025-01-08

## 2024-12-29 NOTE — PROGRESS NOTES
Subjective:       Patient ID: Lydia Head is a 69 y.o. female.    Chief Complaint: COVID-19 Concerns (Sore throat, sinus congestion, cough, headache)    Sore throat, sinus congestion, cough, headache    Review of Systems   Constitutional:  Negative for appetite change, fatigue and fever.   HENT:  Positive for nasal congestion, sinus pressure/congestion and sore throat. Negative for ear pain.    Eyes:  Negative for pain, discharge and itching.   Respiratory:  Positive for cough. Negative for shortness of breath.    Cardiovascular:  Negative for chest pain and leg swelling.   Gastrointestinal:  Negative for abdominal pain, change in bowel habit, nausea and vomiting.   Musculoskeletal:  Negative for back pain, gait problem and neck pain.   Integumentary:  Negative for rash and wound.   Allergic/Immunologic: Negative for immunocompromised state.   Neurological:  Negative for dizziness, weakness and headaches.   All other systems reviewed and are negative.      Objective:      Physical Exam  Vitals and nursing note reviewed.   Constitutional:       General: She is not in acute distress.     Appearance: Normal appearance. She is not ill-appearing, toxic-appearing or diaphoretic.   HENT:      Head: Normocephalic.      Right Ear: Tympanic membrane, ear canal and external ear normal.      Left Ear: Tympanic membrane, ear canal and external ear normal.      Nose: Mucosal edema, congestion and rhinorrhea present.      Right Turbinates: Swollen.      Left Turbinates: Swollen.      Mouth/Throat:      Mouth: Mucous membranes are moist.      Pharynx: Posterior oropharyngeal erythema present. No oropharyngeal exudate.   Eyes:      General: No scleral icterus.        Right eye: No discharge.         Left eye: No discharge.      Extraocular Movements: Extraocular movements intact.      Conjunctiva/sclera: Conjunctivae normal.      Pupils: Pupils are equal, round, and reactive to light.   Cardiovascular:      Rate and Rhythm: Normal  rate and regular rhythm.      Pulses: Normal pulses.      Heart sounds: Normal heart sounds. No murmur heard.  Pulmonary:      Effort: Pulmonary effort is normal. No respiratory distress.      Breath sounds: Normal breath sounds. No wheezing, rhonchi or rales.   Musculoskeletal:         General: Normal range of motion.      Cervical back: Neck supple. No tenderness.   Lymphadenopathy:      Cervical: No cervical adenopathy.   Skin:     General: Skin is warm and dry.      Capillary Refill: Capillary refill takes less than 2 seconds.      Findings: No rash.   Neurological:      Mental Status: She is alert and oriented to person, place, and time.   Psychiatric:         Mood and Affect: Mood normal.         Behavior: Behavior normal.         Thought Content: Thought content normal.         Judgment: Judgment normal.          Assessment:       1. Sore throat    2. Contact with or exposure to viral disease        Plan:   Sore throat  -     POCT Strep A, Molecular    Contact with or exposure to viral disease  -     POCT Influenza A/B Molecular  -     POCT COVID-19 Rapid Screening           Risks, benefits, and side effects were discussed with the patient. All questions were answered to the fullest satisfaction of the patient, and pt verbalized understanding and agreement to treatment plan. Pt was to call with any new or worsening symptoms, or present to the ER

## 2024-12-30 DIAGNOSIS — M25.561 ACUTE PAIN OF RIGHT KNEE: ICD-10-CM

## 2024-12-30 DIAGNOSIS — Z96.641 STATUS POST TOTAL HIP REPLACEMENT, RIGHT: Primary | ICD-10-CM

## 2025-01-02 ENCOUNTER — OFFICE VISIT (OUTPATIENT)
Dept: ORTHOPEDICS | Facility: CLINIC | Age: 70
End: 2025-01-02
Payer: MEDICARE

## 2025-01-02 ENCOUNTER — HOSPITAL ENCOUNTER (OUTPATIENT)
Dept: RADIOLOGY | Facility: HOSPITAL | Age: 70
Discharge: HOME | End: 2025-01-02
Attending: ORTHOPAEDIC SURGERY
Payer: MEDICARE

## 2025-01-02 DIAGNOSIS — M17.11 PATELLOFEMORAL ARTHRITIS OF RIGHT KNEE: Primary | ICD-10-CM

## 2025-01-02 DIAGNOSIS — Z96.641 STATUS POST TOTAL HIP REPLACEMENT, RIGHT: ICD-10-CM

## 2025-01-02 PROCEDURE — 73564 X-RAY EXAM KNEE 4 OR MORE: CPT | Mod: TC,RT

## 2025-01-02 PROCEDURE — 20610 DRAIN/INJ JOINT/BURSA W/O US: CPT | Mod: S$PBB,RT,, | Performed by: ORTHOPAEDIC SURGERY

## 2025-01-02 PROCEDURE — 99999 PR PBB SHADOW E&M-EST. PATIENT-LVL II: CPT | Mod: PBBFAC,,, | Performed by: ORTHOPAEDIC SURGERY

## 2025-01-02 PROCEDURE — 99212 OFFICE O/P EST SF 10 MIN: CPT | Mod: PBBFAC,25 | Performed by: ORTHOPAEDIC SURGERY

## 2025-01-02 PROCEDURE — 99999PBSHW PR PBB SHADOW TECHNICAL ONLY FILED TO HB: Mod: PBBFAC,,,

## 2025-01-02 PROCEDURE — 20610 DRAIN/INJ JOINT/BURSA W/O US: CPT | Mod: PBBFAC | Performed by: ORTHOPAEDIC SURGERY

## 2025-01-02 PROCEDURE — 99203 OFFICE O/P NEW LOW 30 MIN: CPT | Mod: S$PBB,25,, | Performed by: ORTHOPAEDIC SURGERY

## 2025-01-02 RX ORDER — MELOXICAM 7.5 MG/1
7.5 TABLET ORAL DAILY
Qty: 30 TABLET | Refills: 1 | Status: SHIPPED | OUTPATIENT
Start: 2025-01-02

## 2025-01-02 RX ORDER — TRIAMCINOLONE ACETONIDE 40 MG/ML
40 INJECTION, SUSPENSION INTRA-ARTICULAR; INTRAMUSCULAR
Status: COMPLETED | OUTPATIENT
Start: 2025-01-02 | End: 2025-01-02

## 2025-01-02 RX ORDER — BUPIVACAINE HYDROCHLORIDE 2.5 MG/ML
1 INJECTION, SOLUTION INFILTRATION; PERINEURAL
Status: COMPLETED | OUTPATIENT
Start: 2025-01-02 | End: 2025-01-02

## 2025-01-02 RX ADMIN — TRIAMCINOLONE ACETONIDE 40 MG: 400 INJECTION, SUSPENSION INTRA-ARTICULAR; INTRAMUSCULAR at 09:01

## 2025-01-02 RX ADMIN — BUPIVACAINE HYDROCHLORIDE 2.5 MG: 2.5 INJECTION, SOLUTION INFILTRATION; PERINEURAL at 09:01

## 2025-01-02 NOTE — PROGRESS NOTES
CC:   Chief Complaint   Patient presents with    Right Knee - Pain        PREVIOUS INFO:        HISTORY:   2025    Lydia Head  is a 69 y.o. comes in with right knee pain anterior pain that is not really taken anything besides home on for he has been going on for several weeks here no particular injury      PAST MEDICAL HISTORY:   Past Medical History:   Diagnosis Date    Acquired cyst of kidney     Breast cancer screening by mammogram 2024    [facility]    Coronary arteriosclerosis     Encounter for long-term (current) use of other medications     Fatigue     Hx of screening mammography 2018    Hx of screening mammography 2021    [facility]    Hyperlipidemia     Hypertension     Multiple joint pain     Nutritional anemia     Vitamin D deficiency           PAST SURGICAL HISTORY:   Past Surgical History:   Procedure Laterality Date     SECTION  1977    HIP REPLACEMENT ARTHROPLASTY Right     HYSTERECTOMY      ROTATOR CUFF REPAIR Right     procedure done twice per Dr. Jim Cornelius.    TOTAL REPLACEMENT OF BOTH HIP JOINTS USING COMPUTER-ASSISTED NAVIGATION Right     Performed by Dr. Jim Cornelius.    VAGINAL DELIVERY      x 2          ALLERGIES:   Review of patient's allergies indicates:   Allergen Reactions    Codeine Nausea And Vomiting        MEDICATIONS :    Current Outpatient Medications:     aspirin (ECOTRIN) 81 MG EC tablet, Take 81 mg by mouth once daily., Disp: , Rfl:     atorvastatin (LIPITOR) 10 MG tablet, Take 10 mg by mouth once daily., Disp: , Rfl:     azelastine (ASTELIN) 137 mcg (0.1 %) nasal spray, 1 spray (137 mcg total) by Nasal route 2 (two) times daily., Disp: 30 mL, Rfl: 0    calcium citrate-vitamin D3 315-200 mg (CITRACAL+D) 315 mg-5 mcg (200 unit) per tablet, Take 2 tablets by mouth once daily., Disp: , Rfl:     cefdinir (OMNICEF) 300 MG capsule, Take 1 capsule (300 mg total) by mouth 2 (two) times daily. for 10 days, Disp: 20 capsule, Rfl: 0     chlorthalidone (HYGROTEN) 25 MG Tab, 1/2 tab by mouth daily, Disp: , Rfl:     methylPREDNISolone (MEDROL DOSEPACK) 4 mg tablet, use as directed, Disp: 21 each, Rfl: 0    metoprolol succinate (TOPROL-XL) 25 MG 24 hr tablet, Take 1 tablet by mouth once daily., Disp: , Rfl:     naproxen (NAPROSYN) 500 MG tablet, Take 1 tablet (500 mg total) by mouth 2 (two) times daily with meals., Disp: 30 tablet, Rfl: 0    potassium chloride SA (K-DUR,KLOR-CON) 20 MEQ tablet, Take 1 tablet by mouth 2 (two) times a day., Disp: , Rfl:      SOCIAL HISTORY:   Social History     Socioeconomic History    Marital status:    Tobacco Use    Smoking status: Never    Smokeless tobacco: Never   Substance and Sexual Activity    Alcohol use: Never    Drug use: Never    Sexual activity: Not Currently     Partners: Male     Birth control/protection: See Surgical Hx     Comment: hysterectomy        ROS    FAMILY HISTORY:   Family History   Problem Relation Name Age of Onset    Diabetes Mother          age 70    Lung cancer Mother      Breast cancer Mother      Alzheimer's disease Father          age 72    Diabetes Father      No Known Problems Sister          covid age 60    COPD Brother          age 60's    Lung cancer Brother          age 50's    No Known Problems Daughter          hypotension age 40    No Known Problems Son          pre diabetic age 43    No Known Problems Son          age 36    COPD Brother          age 69    No Known Problems Brother          age 66    Asthma Sister          age 75    Breast cancer Sister      Breast cancer Sister          age 72    No Known Problems Sister          age 63          PHYSICAL EXAM: There were no vitals filed for this visit.            There is no height or weight on file to calculate BMI.     In general, this is a well-developed, well-nourished female . The patient is alert, oriented and cooperative.      HEENT:  Normocephalic, atraumatic.  Extraocular movements are intact bilaterally.  The  oropharynx is benign.       NECK:  Nontender with good range of motion.      PULMONARY: Respirations are even and non-labored.       CARDIOVASCULAR: Pulses regular by peripheral palpation.       ABDOMEN:  Soft, non-tender, non-distended.        EXTREMITIES:  Right knee is not hot or red she has full extension flexion to 130 the joint lines really nontender she does have pain with patellofemoral compression    Ortho Exam      RADIOGRAPHIC FINDINGS:  Right knee standing x-rays with the sunrise view in addition total 4 films significant degenerative changes noted of the patellofemoral joint lesser degenerative changes of the mediolateral compartments no fracture dislocations appreciated      .      IMPRESSION:  Significant right knee patellofemoral arthritis thinks she has flared up her patellofemoral joint  Talked to her about the options we are going to try conservative course    PLAN:  Prep of Betadine we injected the right knee 1 cc Kenalog 2 of Marcaine right in a prescription for Mobic can check on her in about a month        No follow-ups on file.         Volodymyr Clinton III      (Subject to voice recognition error, transcription service not allowed)

## 2025-01-25 ENCOUNTER — OFFICE VISIT (OUTPATIENT)
Dept: FAMILY MEDICINE | Facility: CLINIC | Age: 70
End: 2025-01-25
Payer: MEDICARE

## 2025-01-25 VITALS
RESPIRATION RATE: 20 BRPM | OXYGEN SATURATION: 97 % | HEIGHT: 64 IN | TEMPERATURE: 98 F | SYSTOLIC BLOOD PRESSURE: 154 MMHG | HEART RATE: 79 BPM | BODY MASS INDEX: 34.83 KG/M2 | WEIGHT: 204 LBS | DIASTOLIC BLOOD PRESSURE: 90 MMHG

## 2025-01-25 DIAGNOSIS — I10 HYPERTENSION, UNSPECIFIED TYPE: ICD-10-CM

## 2025-01-25 DIAGNOSIS — U07.1 COVID-19: Primary | ICD-10-CM

## 2025-01-25 DIAGNOSIS — Z20.828 CONTACT WITH OR EXPOSURE TO VIRAL DISEASE: ICD-10-CM

## 2025-01-25 DIAGNOSIS — U07.1 COVID-19 VIRUS DETECTED: ICD-10-CM

## 2025-01-25 LAB
CTP QC/QA: YES
SARS-COV-2 RDRP RESP QL NAA+PROBE: POSITIVE

## 2025-01-25 PROCEDURE — 99213 OFFICE O/P EST LOW 20 MIN: CPT | Mod: ,,, | Performed by: NURSE PRACTITIONER

## 2025-01-25 PROCEDURE — 87635 SARS-COV-2 COVID-19 AMP PRB: CPT | Mod: RHCUB | Performed by: NURSE PRACTITIONER

## 2025-01-25 PROCEDURE — 96372 THER/PROPH/DIAG INJ SC/IM: CPT | Mod: ,,, | Performed by: NURSE PRACTITIONER

## 2025-01-25 RX ORDER — NIRMATRELVIR AND RITONAVIR 300-100 MG
3 KIT ORAL 2 TIMES DAILY
Qty: 30 TABLET | Refills: 0 | Status: SHIPPED | OUTPATIENT
Start: 2025-01-25 | End: 2025-01-30

## 2025-01-25 RX ORDER — KETOROLAC TROMETHAMINE 30 MG/ML
30 INJECTION, SOLUTION INTRAMUSCULAR; INTRAVENOUS ONCE
Status: COMPLETED | OUTPATIENT
Start: 2025-01-25 | End: 2025-01-25

## 2025-01-25 RX ORDER — PROMETHAZINE HYDROCHLORIDE AND DEXTROMETHORPHAN HYDROBROMIDE 6.25; 15 MG/5ML; MG/5ML
5 SYRUP ORAL EVERY 12 HOURS PRN
Qty: 100 ML | Refills: 0 | Status: SHIPPED | OUTPATIENT
Start: 2025-01-25 | End: 2025-02-04

## 2025-01-25 RX ADMIN — KETOROLAC TROMETHAMINE 30 MG: 30 INJECTION, SOLUTION INTRAMUSCULAR; INTRAVENOUS at 11:01

## 2025-01-25 NOTE — PROGRESS NOTES
ANGIE Lewis   BUTLERS[facility]KHA TABARES [facility]  [address] [place] [id number]  [phone]      PATIENT NAME: Lydia Head  : 1955  DATE: 25  MRN: [id number]      Billing Provider: ANGIE Lewis  Level of Service: UT OFFICE/OUTPT VISIT, EST, LEVL III, 20-29 MIN  Patient PCP Information       Provider PCP Type    Jose Antoine MD General    Jose Antoine MD Family Medicine            Reason for Visit / Chief Complaint: COVID-19 Concerns (Covid exposure, headache, cough, congestion)       Update PCP  Update Chief Complaint         History of Present Illness / Problem Focused Workflow     Patient presents to clinic with the above listed complaints. She is COVID positive today.       Review of Systems     Review of Systems   Constitutional:  Negative for chills, diaphoresis, fatigue and fever.   HENT:  Positive for congestion. Negative for ear pain, facial swelling and trouble swallowing.    Eyes:  Negative for pain, discharge, redness, itching and visual disturbance.   Respiratory:  Positive for cough. Negative for apnea, chest tightness, shortness of breath and wheezing.    Cardiovascular:  Negative for chest pain, palpitations and leg swelling.   Gastrointestinal:  Negative for abdominal pain, constipation, diarrhea, nausea and vomiting.   Genitourinary:  Negative for dysuria.   Skin:  Negative for rash.   Neurological:  Positive for headaches. Negative for dizziness.     Medical / Social / Family History     Past Medical History:   Diagnosis Date    Acquired cyst of kidney     [facility]    Coronary arteriosclerosis     Encounter for long-term (current) use of other medications     Fatigue     Hx of screening mammography 2018    [facility]    Hyperlipidemia     Hypertension     Multiple joint pain     Nutritional  anemia     Vitamin D deficiency        Past Surgical History:   Procedure Laterality Date     SECTION      HIP REPLACEMENT ARTHROPLASTY Right     HYSTERECTOMY      ROTATOR CUFF REPAIR Right     procedure done twice per Dr. Jim Cornelius.    TOTAL REPLACEMENT OF BOTH HIP JOINTS USING COMPUTER-ASSISTED NAVIGATION Right     Performed by Dr. Jim Cornelius.    VAGINAL DELIVERY      x 2       Social History  Ms.  reports that she has never smoked. She has never used smokeless tobacco. She reports that she does not drink alcohol and does not use drugs.    Family History  Ms.'s family history includes Alzheimer's disease in her father; Asthma in her sister; Breast cancer in her mother, sister, and sister; COPD in her brother and brother; Diabetes in her father and mother; Lung cancer in her brother and mother; No Known Problems in her brother, daughter, sister, sister, son, and son.    Medications and Allergies     Medications  No outpatient medications have been marked as taking for the 25 encounter (Office Visit) with Lizzeth Tapia FNP.       Allergies  Review of patient's allergies indicates:   Allergen Reactions    Codeine Nausea And Vomiting       Physical Examination     Vitals:    25 0921   BP: (!) 154/90   Pulse: 79   Resp: 20   Temp: 97.8 °F (36.6 °C)     Physical Exam  Vitals and nursing note reviewed.   Constitutional:       General: She is awake.      Appearance: Normal appearance.   HENT:      Head: Normocephalic.      Right Ear: Tympanic membrane, ear canal and external ear normal.      Left Ear: Tympanic membrane, ear canal and external ear normal.      Nose: Congestion present.      Right Turbinates: Swollen.      Left Turbinates: Swollen.      Mouth/Throat:      Lips: Pink.      Mouth: Mucous membranes are moist.      Pharynx: Oropharynx is clear. Uvula midline. Posterior oropharyngeal erythema and postnasal drip present.   Eyes:      General: Lids are normal.      Extraocular  Movements: Extraocular movements intact.      Conjunctiva/sclera: Conjunctivae normal.      Pupils: Pupils are equal, round, and reactive to light.   Cardiovascular:      Rate and Rhythm: Normal rate and regular rhythm.      Pulses: Normal pulses.      Heart sounds: Normal heart sounds. No murmur heard.  Pulmonary:      Effort: Pulmonary effort is normal.      Breath sounds: Normal breath sounds. No decreased breath sounds, wheezing, rhonchi or rales.   Musculoskeletal:      Right lower leg: No edema.      Left lower leg: No edema.   Skin:     General: Skin is warm.      Coloration: Skin is not jaundiced.      Findings: No rash.   Neurological:      Mental Status: She is alert. Mental status is at baseline.   Psychiatric:         Mood and Affect: Mood normal.         Behavior: Behavior normal. Behavior is cooperative.     Assessment and Plan (including Health Maintenance)      Problem List  Smart Sets  Document Outside HM   :    Health Maintenance Due   Topic Date Due    TETANUS VACCINE  Never done    RSV Vaccine (Age 60+ and Pregnant patients) (1 - Risk 60-74 years 1-dose series) Never done    Influenza Vaccine (1) 09/01/2024    COVID-19 Vaccine (1 - 2024-25 season) Never done       Problem List Items Addressed This Visit          ID    Contact with or exposure to viral disease - Primary    Relevant Orders    POCT COVID-19 Rapid Screening       Health Maintenance Topics with due status: Not Due       Topic Last Completion Date    Colorectal Cancer Screening 12/11/2019    Lipid Panel 09/05/2024    DEXA Scan 09/26/2024    Mammogram 12/02/2024       Future Appointments   Date Time Provider Department Center   2/4/2025  9:10 AM Volodymyr Clinton III, MD [facility]   12/10/2025  8:00 AM Kajal Alvarez FNP [facility]          Signature:  ANGIE Lewis [facility]  [address] [place] [address] [place]  [id number]  [phone]    Date of encounter: 1/25/25

## 2025-01-25 NOTE — PATIENT INSTRUCTIONS
Hold atorvastatin for 12 hours prior to starting Paxlovid. Hold Atorvastatin while taking Paxlovid and 5 days after completion of taking the medication.   Take over the counter CORICIDIN HBP as needed pre package directions  Drink Plenty of fluids and ambulate  Go to ER for chest pain or shortness of breath

## 2025-04-17 ENCOUNTER — OFFICE VISIT (OUTPATIENT)
Dept: FAMILY MEDICINE | Facility: CLINIC | Age: 70
End: 2025-04-17
Payer: MEDICARE

## 2025-04-17 ENCOUNTER — PATIENT OUTREACH (OUTPATIENT)
Facility: HOSPITAL | Age: 70
End: 2025-04-17
Payer: MEDICARE

## 2025-04-17 VITALS
OXYGEN SATURATION: 96 % | WEIGHT: 198 LBS | HEIGHT: 64 IN | RESPIRATION RATE: 18 BRPM | HEART RATE: 74 BPM | BODY MASS INDEX: 33.8 KG/M2 | DIASTOLIC BLOOD PRESSURE: 84 MMHG | SYSTOLIC BLOOD PRESSURE: 128 MMHG

## 2025-04-17 DIAGNOSIS — E11.9 DIABETES MELLITUS WITHOUT COMPLICATION: Primary | ICD-10-CM

## 2025-04-17 DIAGNOSIS — E78.2 MIXED HYPERLIPIDEMIA: ICD-10-CM

## 2025-04-17 DIAGNOSIS — M54.50 ACUTE BILATERAL LOW BACK PAIN WITHOUT SCIATICA: ICD-10-CM

## 2025-04-17 DIAGNOSIS — E66.09 CLASS 1 OBESITY DUE TO EXCESS CALORIES WITH SERIOUS COMORBIDITY AND BODY MASS INDEX (BMI) OF 33.0 TO 33.9 IN ADULT: ICD-10-CM

## 2025-04-17 DIAGNOSIS — I10 ESSENTIAL HYPERTENSION, BENIGN: ICD-10-CM

## 2025-04-17 DIAGNOSIS — K21.9 GERD WITHOUT ESOPHAGITIS: ICD-10-CM

## 2025-04-17 DIAGNOSIS — E66.811 CLASS 1 OBESITY DUE TO EXCESS CALORIES WITH SERIOUS COMORBIDITY AND BODY MASS INDEX (BMI) OF 33.0 TO 33.9 IN ADULT: ICD-10-CM

## 2025-04-17 PROBLEM — J01.90 ACUTE NON-RECURRENT SINUSITIS: Status: RESOLVED | Noted: 2024-11-25 | Resolved: 2025-04-17

## 2025-04-17 PROBLEM — M79.604 PAIN OF RIGHT LOWER EXTREMITY: Status: RESOLVED | Noted: 2022-01-27 | Resolved: 2025-04-17

## 2025-04-17 PROBLEM — R73.9 HYPERGLYCEMIA: Status: RESOLVED | Noted: 2021-12-09 | Resolved: 2025-04-17

## 2025-04-17 PROBLEM — Z20.822 CONTACT WITH AND (SUSPECTED) EXPOSURE TO COVID-19: Status: RESOLVED | Noted: 2024-11-25 | Resolved: 2025-04-17

## 2025-04-17 PROBLEM — J02.9 SORE THROAT: Status: RESOLVED | Noted: 2024-12-29 | Resolved: 2025-04-17

## 2025-04-17 PROBLEM — K29.00 ACUTE SUPERFICIAL GASTRITIS WITHOUT HEMORRHAGE: Status: RESOLVED | Noted: 2023-09-19 | Resolved: 2025-04-17

## 2025-04-17 PROBLEM — Z20.828 CONTACT WITH OR EXPOSURE TO VIRAL DISEASE: Status: RESOLVED | Noted: 2024-12-29 | Resolved: 2025-04-17

## 2025-04-17 PROBLEM — R13.10 DYSPHAGIA: Status: RESOLVED | Noted: 2023-06-23 | Resolved: 2025-04-17

## 2025-04-17 PROBLEM — R51.9 SINUS HEADACHE: Status: RESOLVED | Noted: 2024-11-25 | Resolved: 2025-04-17

## 2025-04-17 LAB
ALBUMIN SERPL BCP-MCNC: 4 G/DL (ref 3.4–4.8)
ALBUMIN/GLOB SERPL: 1.4 {RATIO}
ALP SERPL-CCNC: 69 U/L (ref 40–150)
ALT SERPL W P-5'-P-CCNC: 16 U/L
ANION GAP SERPL CALCULATED.3IONS-SCNC: 10 MMOL/L (ref 7–16)
ANISOCYTOSIS BLD QL SMEAR: ABNORMAL
AST SERPL W P-5'-P-CCNC: 21 U/L (ref 11–45)
BASOPHILS # BLD AUTO: 0.04 K/UL (ref 0–0.2)
BASOPHILS NFR BLD AUTO: 1.4 % (ref 0–1)
BILIRUB SERPL-MCNC: 0.4 MG/DL
BILIRUB UR QL STRIP: NEGATIVE
BUN SERPL-MCNC: 8 MG/DL (ref 10–20)
BUN/CREAT SERPL: 10 (ref 6–20)
CALCIUM SERPL-MCNC: 8.8 MG/DL (ref 8.4–10.2)
CHLORIDE SERPL-SCNC: 112 MMOL/L (ref 98–107)
CHOLEST SERPL-MCNC: 193 MG/DL
CHOLEST/HDLC SERPL: 3.8 {RATIO}
CLARITY UR: CLEAR
CO2 SERPL-SCNC: 21 MMOL/L (ref 23–31)
COLOR UR: COLORLESS
CREAT SERPL-MCNC: 0.79 MG/DL (ref 0.55–1.02)
CREAT UR-MCNC: 51 MG/DL (ref 15–325)
DIFFERENTIAL METHOD BLD: ABNORMAL
EGFR (NO RACE VARIABLE) (RUSH/TITUS): 82 ML/MIN/1.73M2
EOSINOPHIL # BLD AUTO: 0.06 K/UL (ref 0–0.5)
EOSINOPHIL NFR BLD AUTO: 2.2 % (ref 1–4)
EOSINOPHIL NFR BLD MANUAL: 3 % (ref 1–4)
ERYTHROCYTE [DISTWIDTH] IN BLOOD BY AUTOMATED COUNT: 14.4 % (ref 11.5–14.5)
EST. AVERAGE GLUCOSE BLD GHB EST-MCNC: 120 MG/DL
GLOBULIN SER-MCNC: 2.9 G/DL (ref 2–4)
GLUCOSE SERPL-MCNC: 84 MG/DL (ref 82–115)
GLUCOSE UR STRIP-MCNC: NORMAL MG/DL
HBA1C MFR BLD HPLC: 5.8 %
HCT VFR BLD AUTO: 40 % (ref 38–47)
HDLC SERPL-MCNC: 51 MG/DL (ref 35–60)
HGB BLD-MCNC: 12.6 G/DL (ref 12–16)
IMM GRANULOCYTES # BLD AUTO: 0 K/UL (ref 0–0.04)
IMM GRANULOCYTES NFR BLD: 0 % (ref 0–0.4)
KETONES UR STRIP-SCNC: NEGATIVE MG/DL
LDLC SERPL CALC-MCNC: 126 MG/DL
LDLC/HDLC SERPL: 2.5 {RATIO}
LEUKOCYTE ESTERASE UR QL STRIP: NEGATIVE
LYMPHOCYTES # BLD AUTO: 1.35 K/UL (ref 1–4.8)
LYMPHOCYTES NFR BLD AUTO: 48.7 % (ref 27–41)
LYMPHOCYTES NFR BLD MANUAL: 51 % (ref 27–41)
MCH RBC QN AUTO: 25.9 PG (ref 27–31)
MCHC RBC AUTO-ENTMCNC: 31.5 G/DL (ref 32–36)
MCV RBC AUTO: 82.1 FL (ref 80–96)
MICROALBUMIN UR-MCNC: <0.5 MG/DL
MICROALBUMIN/CREAT RATIO PNL UR: NORMAL
MICROCYTES BLD QL SMEAR: ABNORMAL
MONOCYTES # BLD AUTO: 0.34 K/UL (ref 0–0.8)
MONOCYTES NFR BLD AUTO: 12.3 % (ref 2–6)
MONOCYTES NFR BLD MANUAL: 2 % (ref 2–6)
MPC BLD CALC-MCNC: 10 FL (ref 9.4–12.4)
NEUTROPHILS # BLD AUTO: 0.98 K/UL (ref 1.8–7.7)
NEUTROPHILS NFR BLD AUTO: 35.4 % (ref 53–65)
NEUTS SEG NFR BLD MANUAL: 44 % (ref 50–62)
NITRITE UR QL STRIP: NEGATIVE
NONHDLC SERPL-MCNC: 142 MG/DL
NRBC # BLD AUTO: 0 X10E3/UL
NRBC, AUTO (.00): 0 %
PH UR STRIP: 6 PH UNITS
PLATELET # BLD AUTO: 338 K/UL (ref 150–400)
PLATELET MORPHOLOGY: NORMAL
POTASSIUM SERPL-SCNC: 3.9 MMOL/L (ref 3.5–5.1)
PROT SERPL-MCNC: 6.9 G/DL (ref 5.8–7.6)
PROT UR QL STRIP: NEGATIVE
RBC # BLD AUTO: 4.87 M/UL (ref 4.2–5.4)
RBC # UR STRIP: NEGATIVE /UL
SODIUM SERPL-SCNC: 139 MMOL/L (ref 136–145)
SP GR UR STRIP: 1.01
TARGETS BLD QL SMEAR: ABNORMAL
TRIGL SERPL-MCNC: 81 MG/DL (ref 37–140)
TSH SERPL DL<=0.005 MIU/L-ACNC: 0.69 UIU/ML (ref 0.35–4.94)
UROBILINOGEN UR STRIP-ACNC: NORMAL MG/DL
VLDLC SERPL-MCNC: 16 MG/DL
WBC # BLD AUTO: 2.77 K/UL (ref 4.5–11)

## 2025-04-17 PROCEDURE — 99214 OFFICE O/P EST MOD 30 MIN: CPT | Mod: ,,, | Performed by: NURSE PRACTITIONER

## 2025-04-17 PROCEDURE — 85025 COMPLETE CBC W/AUTO DIFF WBC: CPT | Mod: ,,, | Performed by: CLINICAL MEDICAL LABORATORY

## 2025-04-17 PROCEDURE — 80053 COMPREHEN METABOLIC PANEL: CPT | Mod: ,,, | Performed by: CLINICAL MEDICAL LABORATORY

## 2025-04-17 PROCEDURE — 83036 HEMOGLOBIN GLYCOSYLATED A1C: CPT | Mod: ,,, | Performed by: CLINICAL MEDICAL LABORATORY

## 2025-04-17 PROCEDURE — 82570 ASSAY OF URINE CREATININE: CPT | Mod: ,,, | Performed by: CLINICAL MEDICAL LABORATORY

## 2025-04-17 PROCEDURE — 81003 URINALYSIS AUTO W/O SCOPE: CPT | Mod: QW,,, | Performed by: CLINICAL MEDICAL LABORATORY

## 2025-04-17 PROCEDURE — 82043 UR ALBUMIN QUANTITATIVE: CPT | Mod: ,,, | Performed by: CLINICAL MEDICAL LABORATORY

## 2025-04-17 PROCEDURE — 84443 ASSAY THYROID STIM HORMONE: CPT | Mod: ,,, | Performed by: CLINICAL MEDICAL LABORATORY

## 2025-04-17 PROCEDURE — 96372 THER/PROPH/DIAG INJ SC/IM: CPT | Mod: ,,, | Performed by: NURSE PRACTITIONER

## 2025-04-17 PROCEDURE — 80061 LIPID PANEL: CPT | Mod: ,,, | Performed by: CLINICAL MEDICAL LABORATORY

## 2025-04-17 RX ORDER — CALCIUM CARB/VITAMIN D3/VIT K1 500-500-40
400 TABLET,CHEWABLE ORAL DAILY
COMMUNITY

## 2025-04-17 RX ORDER — FAMOTIDINE 20 MG/1
20 TABLET, FILM COATED ORAL 2 TIMES DAILY
Qty: 180 TABLET | Refills: 3 | Status: SHIPPED | OUTPATIENT
Start: 2025-04-17

## 2025-04-17 RX ORDER — FAMOTIDINE 20 MG/1
20 TABLET, FILM COATED ORAL 2 TIMES DAILY
COMMUNITY
End: 2025-04-17 | Stop reason: SDUPTHER

## 2025-04-17 RX ORDER — DEXAMETHASONE SODIUM PHOSPHATE 4 MG/ML
4 INJECTION, SOLUTION INTRA-ARTICULAR; INTRALESIONAL; INTRAMUSCULAR; INTRAVENOUS; SOFT TISSUE
Status: COMPLETED | OUTPATIENT
Start: 2025-04-17 | End: 2025-04-17

## 2025-04-17 RX ORDER — DICLOFENAC SODIUM 10 MG/G
2 GEL TOPICAL DAILY
Qty: 450 G | Refills: 1 | Status: SHIPPED | OUTPATIENT
Start: 2025-04-17

## 2025-04-17 RX ADMIN — DEXAMETHASONE SODIUM PHOSPHATE 4 MG: 4 INJECTION, SOLUTION INTRA-ARTICULAR; INTRALESIONAL; INTRAMUSCULAR; INTRAVENOUS; SOFT TISSUE at 11:04

## 2025-04-17 NOTE — ASSESSMENT & PLAN NOTE
A1C today  Controlled with diet and exercise.  Follow up in 6 mos  UTD eye exam   Declines foot exam today.

## 2025-04-17 NOTE — PROGRESS NOTES
Subjective:       Patient ID: Lydia Head is a 69 y.o. female.    Chief Complaint: Back Pain (Pt complained of lower back pain on both sides of her back but the pain is not in the middle of her back ), Annual Exam, and Health Maintenance (TETANUS VACCINE - decline /RSV Vaccine (Age 60+ and Pregnant patients)(1 - Risk 60-74 years 1-dose series) decline /Influenza Vaccine(1) due on 09/01/2024- [email or web address] done /COVID-19 Vaccine(1 - 2024-25 season) [email or web address] /)    Ms. Freeman presents to clinic with back pain. Pain started several weeks ago. Does not radiate, no numbness tingling. NO loss of bowel or urinary incontinence.     Active Problem List with Overview Notes    Diagnosis Date Noted    GERD without esophagitis 04/21/2025    Acute bilateral low back pain without sciatica 04/21/2025    Class 1 obesity due to excess calories with serious comorbidity and body mass index (BMI) of 33.0 to 33.9 in adult 04/17/2025    Diabetes mellitus without complication 09/05/2024    Vitamin D deficiency 09/05/2024    Hyperlipidemia 09/05/2024    Essential hypertension, benign 09/05/2024    Esophageal stricture 09/19/2023    HH (hiatus hernia) 09/19/2023    Fatty liver 06/23/2023    Venous insufficiency 01/27/2022    Primary insomnia         Review of Systems   Constitutional:  Negative for fatigue and fever.   HENT:  Negative for congestion, hearing loss, postnasal drip, rhinorrhea, sore throat, tinnitus and voice change.    Respiratory:  Negative for apnea, cough, choking, chest tightness and shortness of breath.    Cardiovascular:  Negative for chest pain, palpitations and leg swelling.   Gastrointestinal:  Negative for abdominal pain, constipation, diarrhea and nausea.   Genitourinary:  Negative for difficulty urinating.   Musculoskeletal:  Positive for back pain.   Neurological:  Negative for dizziness, syncope, weakness and headaches.   Psychiatric/Behavioral:  Negative for sleep disturbance.          A1C:  Recent Labs   Lab 11/03/22  1557 09/05/24  0847 04/17/25  1124   Hemoglobin A1C 5.9 5.9 5.8     CBC:  Recent Labs   Lab 06/10/24  0921 09/05/24  0847 04/17/25  1124   WBC 3.01 L 3.25 L 2.77 L   RBC 5.03 4.93 4.87   Hemoglobin 13.1 12.8 12.6   Hematocrit 40.8 40.1 40.0   Platelet Count 311 288 338   MCV 81.1 81.3 82.1   MCH 26.0 L 26.0 L 25.9 L   MCHC 32.1 31.9 L 31.5 L     CMP:  Recent Labs   Lab 06/10/24  0921 09/05/24  0847 04/17/25  1124   Glucose 92 83 84   Calcium 9.1 9.0 8.8   Albumin 3.9 3.7 4.0   Total Protein 7.2 7.3 6.9   Sodium 142 143 139   Potassium 4.1 4.2 3.9   CO2 22 21 21 L   Chloride 113 H 115 H 112 H   BUN 13 15 8 L   Creatinine 0.99 0.99 0.79   Alk Phos 75 84 69   ALT 20 28 16   AST 19 17 21   Bilirubin, Total 0.5 0.2 0.4     LIPIDS:  Recent Labs   Lab 04/17/25  1124   TSH 0.693   HDL Cholesterol 51   Cholesterol 193   Triglycerides 81   LDL Calculated 126   Cholesterol/HDL Ratio (Risk Factor) 3.8   Non-     TSH:  Recent Labs   Lab 11/03/22  1557 09/05/24  0847 04/17/25  1124   TSH 1.520 2.290 0.693        Objective:      Vitals:    04/17/25 1038   BP: 128/84   Pulse: 74   Resp: 18      Physical Exam  Constitutional:       Appearance: Normal appearance. She is well-developed and normal weight.   HENT:      Head: Normocephalic.      Right Ear: External ear normal.      Left Ear: External ear normal.      Nose: Nose normal.      Mouth/Throat:      Lips: Pink.      Mouth: Mucous membranes are moist.      Pharynx: Oropharynx is clear.   Eyes:      General: Lids are normal.      Pupils: Pupils are equal, round, and reactive to light.   Cardiovascular:      Rate and Rhythm: Normal rate and regular rhythm.      Pulses: Normal pulses.      Heart sounds: Normal heart sounds.   Pulmonary:      Effort: Pulmonary effort is normal.      Breath sounds: Normal breath sounds.   Abdominal:      Palpations: Abdomen is soft.   Musculoskeletal:         General: Normal range of motion.      Cervical  back: Normal and normal range of motion.      Thoracic back: Normal.        Back:    Skin:     General: Skin is warm and dry.   Neurological:      Mental Status: She is alert and oriented to person, place, and time.   Psychiatric:         Attention and Perception: Attention normal.         Mood and Affect: Mood normal.         Speech: Speech normal.         Behavior: Behavior normal. Behavior is cooperative.       Assessment:       1. Diabetes mellitus without complication    2. Class 1 obesity due to excess calories with serious comorbidity and body mass index (BMI) of 33.0 to 33.9 in adult    3. Essential hypertension, benign    4. Mixed hyperlipidemia    5. GERD without esophagitis    6. Acute bilateral low back pain without sciatica        Plan:     Problem List Items Addressed This Visit          Cardiac/Vascular    Hyperlipidemia (Chronic)    Current Assessment & Plan   The current medical regimen is effective;  continue present plan and medications.  Labs today  Followed by Evelina Marin   [facility] refills.  Low cholesterol diet         Relevant Orders    TSH (Completed)    Lipid Panel (Completed)    Hemoglobin A1C (Completed)    Comprehensive Metabolic Panel (Completed)    CBC Auto Differential (Completed)    Microalbumin/Creatinine Ratio, Urine (Completed)    Essential hypertension, benign (Chronic)    Current Assessment & Plan   The current medical regimen is effective;  continue present plan and medications.  Labs today  128/84  Followed by Evelina Marin         Relevant Orders    TSH (Completed)    Lipid Panel (Completed)    Hemoglobin A1C (Completed)    Comprehensive Metabolic Panel (Completed)    CBC Auto Differential (Completed)    Microalbumin/Creatinine Ratio, Urine (Completed)       Endocrine    Diabetes mellitus without complication - Primary (Chronic)    Current Assessment & Plan   A1C today  Controlled with diet and exercise.  Follow up in 6 mos  UTD eye exam   Declines foot exam today.           Relevant Orders    TSH (Completed)    Lipid Panel (Completed)    Hemoglobin A1C (Completed)    Comprehensive Metabolic Panel (Completed)    CBC Auto Differential (Completed)    Microalbumin/Creatinine Ratio, Urine (Completed)    Urinalysis, Reflex to Urine Culture (Completed)    Class 1 obesity due to excess calories with serious comorbidity and body mass index (BMI) of 33.0 to 33.9 in adult    Current Assessment & Plan   Recommend diet and weight loss  Increase activity 150 mins weekly         Relevant Orders    TSH (Completed)    Lipid Panel (Completed)    Hemoglobin A1C (Completed)    Comprehensive Metabolic Panel (Completed)    CBC Auto Differential (Completed)    Microalbumin/Creatinine Ratio, Urine (Completed)       GI    GERD without esophagitis    Current Assessment & Plan   Refill Pepcid.  Chronic, controlled.         Relevant Medications    famotidine (PEPCID) 20 MG tablet       Orthopedic    Acute bilateral low back pain without sciatica    Current Assessment & Plan   IM decadron  Voltaren Gel  Does not want NSAIDs due to elevated BP.  Daily stretching. Discussed PT will consider.         Relevant Medications    diclofenac sodium (VOLTAREN ARTHRITIS PAIN) 1 % Gel       Health Maintenance:  Health Maintenance Topics with due status: Not Due       Topic Last Completion Date    Colorectal Cancer Screening 12/11/2019    DEXA Scan 09/26/2024    Mammogram 12/02/2024    Low Dose Statin 12/10/2024    Diabetes Urine Screening 04/17/2025    Lipid Panel 04/17/2025    Hemoglobin A1c 04/17/2025           [facility]   4/17/25

## 2025-04-17 NOTE — LETTER
AUTHORIZATION FOR RELEASE OF   CONFIDENTIAL INFORMATION    Dear [facility],    We are seeing Lydia Head, date of birth 1955, in the clinic at [facility]. Jose Antoine MD is the patient's PCP. Lydia Head has an outstanding lab/procedure at the time we reviewed her chart. In order to help keep her health information updated, she has authorized us to request the following medical record(s):        (  )  MAMMOGRAM                                      (  )  COLONOSCOPY      (  )  PAP SMEAR                                          (  )  OUTSIDE LAB RESULTS     (  )  DEXA SCAN                                          ( x )  EYE EXAM            (  )  FOOT EXAM                                          (  )  ENTIRE RECORD     (  )  OUTSIDE IMMUNIZATIONS                 (  )  _______________         Please fax records to Aba Van LPN Care Coordinator at [phone].      If you have any questions, please contact Aba at [phone].           Patient Name: Lydia Head  : 1955  Patient Phone #: [phone]

## 2025-04-17 NOTE — ASSESSMENT & PLAN NOTE
The current medical regimen is effective;  continue present plan and medications.  Labs today  128/84  Followed by Evelina Marin

## 2025-04-17 NOTE — PROGRESS NOTES
Population Health Chart Review & Patient Outreach Details    Health Maintenance Topics Addressed and Outreach Outcomes / Actions Taken:  Diabetic Eye Exam [x] ANALISA sent to [facility].

## 2025-04-21 ENCOUNTER — RESULTS FOLLOW-UP (OUTPATIENT)
Dept: FAMILY MEDICINE | Facility: CLINIC | Age: 70
End: 2025-04-21
Payer: MEDICARE

## 2025-04-21 PROBLEM — M54.50 ACUTE BILATERAL LOW BACK PAIN WITHOUT SCIATICA: Status: ACTIVE | Noted: 2025-04-21

## 2025-04-21 PROBLEM — K21.9 GERD WITHOUT ESOPHAGITIS: Status: ACTIVE | Noted: 2025-04-21

## 2025-04-22 NOTE — ASSESSMENT & PLAN NOTE
IM decadron  Voltaren Gel  Does not want NSAIDs due to elevated BP.  Daily stretching. Discussed PT will consider.

## 2025-04-22 NOTE — ASSESSMENT & PLAN NOTE
The current medical regimen is effective;  continue present plan and medications.  Labs today  Followed by Evelina Marin   UTD refills.  Low cholesterol diet